# Patient Record
Sex: FEMALE | Race: BLACK OR AFRICAN AMERICAN
[De-identification: names, ages, dates, MRNs, and addresses within clinical notes are randomized per-mention and may not be internally consistent; named-entity substitution may affect disease eponyms.]

---

## 2017-01-01 ENCOUNTER — HOSPITAL ENCOUNTER (EMERGENCY)
Dept: HOSPITAL 47 - EC | Age: 37
Discharge: HOME | End: 2017-01-01
Payer: COMMERCIAL

## 2017-01-01 VITALS
TEMPERATURE: 98.4 F | HEART RATE: 87 BPM | DIASTOLIC BLOOD PRESSURE: 78 MMHG | SYSTOLIC BLOOD PRESSURE: 145 MMHG | RESPIRATION RATE: 18 BRPM

## 2017-01-01 DIAGNOSIS — Z88.1: ICD-10-CM
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
## 2017-01-01 DIAGNOSIS — F17.200: ICD-10-CM

## 2017-01-01 DIAGNOSIS — Z23: ICD-10-CM

## 2017-01-01 DIAGNOSIS — Z88.2: ICD-10-CM

## 2017-01-01 DIAGNOSIS — S61.412A: Primary | ICD-10-CM

## 2017-01-01 PROCEDURE — 12001 RPR S/N/AX/GEN/TRNK 2.5CM/<: CPT

## 2017-01-01 PROCEDURE — 90715 TDAP VACCINE 7 YRS/> IM: CPT

## 2017-01-01 PROCEDURE — 99282 EMERGENCY DEPT VISIT SF MDM: CPT

## 2017-01-01 PROCEDURE — 90471 IMMUNIZATION ADMIN: CPT

## 2017-01-01 NOTE — ED
Wound/Laceration HPI





- General


Chief Complaint: Wound/Laceration


Stated Complaint: LACERATION


Time Seen by Provider: 17 03:19


Source: patient, RN notes reviewed


Mode of arrival: ambulatory


Limitations: no limitations





- History of Present Illness


Initial Comments: 





36-year-old female presents emergency Department chief complaint laceration to 

her left hand third digit.  Patient states his happened again to light bulb.  

Patient states she's unsure last tetanus was.  Patient denies any decreased 

range of motion or numbness or tingling.  Patient offers no other complaints.





- Related Data


 Home Medications











 Medication  Instructions  Recorded  Confirmed


 


No Known Home Medications [No  17





Known Home Medications]   











 Allergies











Allergy/AdvReac Type Severity Reaction Status Date / Time


 


sulfamethoxazole Allergy  Rash/Hives Verified 17 02:59





[From Bactrim]     


 


trimethoprim [From Bactrim] Allergy  Rash/Hives Verified 17 02:59














Review of Systems


ROS Statement: 


Those systems with pertinent positive or pertinent negative responses have been 

documented in the HPI.





ROS Other: All systems not noted in ROS Statement are negative.





Past Medical History


Past Medical History: No Reported History


Additional Past Medical History / Comment(s): Anemia


History of Any Multi-Drug Resistant Organisms: None Reported


Past Surgical History:  Section, Tubal Ligation


Additional Past Surgical History / Comment(s): ganglion cyst removal-left wrist


Past Anesthesia/Blood Transfusion Reactions: No Reported Reaction


Past Psychological History: No Psychological Hx Reported


Smoking Status: Current every day smoker


Past Alcohol Use History: Occasional


Past Drug Use History: None Reported





- Past Family History


  ** Mother


Family Medical History: Hypertension





  ** Father


Family Medical History: Cancer





General Exam


Limitations: no limitations


General appearance: alert, in no apparent distress


Neck exam: Present: normal inspection.  Absent: tenderness, meningismus, 

lymphadenopathy


Respiratory exam: Present: normal lung sounds bilaterally.  Absent: respiratory 

distress, wheezes, rales, rhonchi, stridor


Cardiovascular Exam: Present: regular rate, normal rhythm, normal heart sounds.

  Absent: systolic murmur, diastolic murmur, rubs, gallop, clicks


Extremities exam: Present: other (Left hand third digit there is a 2 cm 

laceration at the base of the third digit no tendon involvement capillary 

refill 2 seconds)


Neurological exam: Present: alert, oriented X3, CN II-XII intact, reflexes 

normal.  Absent: motor sensory deficit


Skin exam: Present: warm, dry





Course





 Vital Signs











  17





  02:57


 


Temperature 98.4 F


 


Pulse Rate 87


 


Respiratory 18





Rate 


 


Blood Pressure 145/78


 


O2 Sat by Pulse 98





Oximetry 














Procedures





- Laceration


  ** Laceration #1


Consent Obtained: verbal consent


Indication: laceration


Site: hand (Left hand third digit)


Size (cm): 2


Description: irregular


Depth: simple, single layer


Anesthetic Used: lidocaine 1%, without epi


Anesthesia Technique: local infiltration


Amount (mls): 3


Pre-repair: wound explored, irrigated extensively, deep structures intact


Type of Sutures: nylon


Size of Sutures: 4-0


Number of Sutures: 4


Technique: simple, interrupted


Patient Tolerated Procedure: well, no complications





Disposition


Clinical Impression: 


 Laceration of left hand





Disposition: HOME SELF-CARE


Condition: Stable


Instructions:  Finger Laceration (ED), Care For Your Stitches (ED)


Additional Instructions: 


Have sutures removed in 10 days.  Wash the wound twice daily with soap and 

water.Please return to the Emergency Department if symptoms worsen or any other 

concerns.


Time of Disposition: 03:40

## 2017-02-03 ENCOUNTER — HOSPITAL ENCOUNTER (EMERGENCY)
Dept: HOSPITAL 47 - EC | Age: 37
Discharge: HOME | End: 2017-02-03
Payer: COMMERCIAL

## 2017-02-03 VITALS
RESPIRATION RATE: 15 BRPM | HEART RATE: 82 BPM | TEMPERATURE: 100.1 F | DIASTOLIC BLOOD PRESSURE: 55 MMHG | SYSTOLIC BLOOD PRESSURE: 106 MMHG

## 2017-02-03 DIAGNOSIS — R19.7: Primary | ICD-10-CM

## 2017-02-03 DIAGNOSIS — F17.200: ICD-10-CM

## 2017-02-03 DIAGNOSIS — Z88.2: ICD-10-CM

## 2017-02-03 DIAGNOSIS — R11.2: ICD-10-CM

## 2017-02-03 LAB
ALP SERPL-CCNC: 62 U/L (ref 38–126)
ALT SERPL-CCNC: 22 U/L (ref 9–52)
AMYLASE SERPL-CCNC: 50 U/L (ref 30–110)
ANION GAP SERPL CALC-SCNC: 11 MMOL/L
AST SERPL-CCNC: 14 U/L (ref 14–36)
BASOPHILS # BLD AUTO: 0.1 K/UL (ref 0–0.2)
BASOPHILS NFR BLD AUTO: 2 %
BUN SERPL-SCNC: 10 MG/DL (ref 7–17)
CALCIUM SPEC-MCNC: 8.5 MG/DL (ref 8.4–10.2)
CH: 29.1
CHCM: 32.8
CHLORIDE SERPL-SCNC: 107 MMOL/L (ref 98–107)
CO2 SERPL-SCNC: 21 MMOL/L (ref 22–30)
EOSINOPHIL # BLD AUTO: 0.1 K/UL (ref 0–0.7)
EOSINOPHIL NFR BLD AUTO: 2 %
ERYTHROCYTE [DISTWIDTH] IN BLOOD BY AUTOMATED COUNT: 4.33 M/UL (ref 3.8–5.4)
ERYTHROCYTE [DISTWIDTH] IN BLOOD: 12.6 % (ref 11.5–15.5)
GLUCOSE SERPL-MCNC: 77 MG/DL (ref 74–99)
HCT VFR BLD AUTO: 38.7 % (ref 34–46)
HDW: 2.49
HGB BLD-MCNC: 12.6 GM/DL (ref 11.4–16)
LUC NFR BLD AUTO: 1 %
LYMPHOCYTES # SPEC AUTO: 0.3 K/UL (ref 1–4.8)
LYMPHOCYTES NFR SPEC AUTO: 6 %
MCH RBC QN AUTO: 29.1 PG (ref 25–35)
MCHC RBC AUTO-ENTMCNC: 32.6 G/DL (ref 31–37)
MCV RBC AUTO: 89.3 FL (ref 80–100)
MONOCYTES # BLD AUTO: 0.3 K/UL (ref 0–1)
MONOCYTES NFR BLD AUTO: 5 %
NEUTROPHILS # BLD AUTO: 4.3 K/UL (ref 1.3–7.7)
NEUTROPHILS NFR BLD AUTO: 84 %
NON-AFRICAN AMERICAN GFR(MDRD): >60
PARTICLE COUNT: 7053
PH UR: 5.5 [PH] (ref 5–8)
POTASSIUM SERPL-SCNC: 3.7 MMOL/L (ref 3.5–5.1)
PROT SERPL-MCNC: 6.6 G/DL (ref 6.3–8.2)
RBC UR QL: 156 /HPF (ref 0–5)
SODIUM SERPL-SCNC: 139 MMOL/L (ref 137–145)
SP GR UR: 1.02 (ref 1–1.03)
SQUAMOUS UR QL AUTO: 2 /HPF (ref 0–4)
UA BILLING (MACRO VS. MICRO): (no result)
UROBILINOGEN UR QL STRIP: <2 MG/DL (ref ?–2)
WBC # BLD AUTO: 0.05 10*3/UL
WBC # BLD AUTO: 5.2 K/UL (ref 3.8–10.6)
WBC #/AREA URNS HPF: 1 /HPF (ref 0–5)
WBC (PEROX): 5.33

## 2017-02-03 PROCEDURE — 81001 URINALYSIS AUTO W/SCOPE: CPT

## 2017-02-03 PROCEDURE — 96372 THER/PROPH/DIAG INJ SC/IM: CPT

## 2017-02-03 PROCEDURE — 99284 EMERGENCY DEPT VISIT MOD MDM: CPT

## 2017-02-03 PROCEDURE — 85025 COMPLETE CBC W/AUTO DIFF WBC: CPT

## 2017-02-03 PROCEDURE — 96361 HYDRATE IV INFUSION ADD-ON: CPT

## 2017-02-03 PROCEDURE — 80053 COMPREHEN METABOLIC PANEL: CPT

## 2017-02-03 PROCEDURE — 36415 COLL VENOUS BLD VENIPUNCTURE: CPT

## 2017-02-03 PROCEDURE — 96374 THER/PROPH/DIAG INJ IV PUSH: CPT

## 2017-02-03 PROCEDURE — 82150 ASSAY OF AMYLASE: CPT

## 2017-02-03 PROCEDURE — 83690 ASSAY OF LIPASE: CPT

## 2017-02-03 PROCEDURE — 81025 URINE PREGNANCY TEST: CPT

## 2017-02-03 NOTE — ED
General Adult HPI





- General


Chief complaint: Nausea/Vomiting/Diarrhea


Stated complaint: flu like symptoms


Time Seen by Provider: 17 13:39


Source: patient, RN notes reviewed


Mode of arrival: ambulatory


Limitations: no limitations





- History of Present Illness


Initial comments: 





Patient 36-year-old female who presents emergency room today with a chief 

complaint of symptoms of nausea and diarrhea that started early this morning.  

Does admit that her children have similar symptoms at home.  She denies any 

abdominal pain.  States she still feeling nauseated.  States she did take 

Zofran at home.  States she's had a few episodes diarrhea.  Denies any signs of 

blood.  Patient denies any other complaints or symptoms currently. Patient 

denies any recent fever, chills, shortness of breath, chest pain, back pain, 

numbness or tingling, dysuria or hematuria, constipation, headaches or visual 

changes, or any other complaints.





- Related Data


 Previous Rx's











 Medication  Instructions  Recorded


 


Ondansetron Odt [Zofran ODT] 4 mg PO Q8HR PRN #15 tab 17











 Allergies











Allergy/AdvReac Type Severity Reaction Status Date / Time


 


sulfamethoxazole Allergy  Rash/Hives Verified 17 13:41





[From Bactrim]     


 


trimethoprim [From Bactrim] Allergy  Rash/Hives Verified 17 13:41














Review of Systems


ROS Statement: 


Those systems with pertinent positive or pertinent negative responses have been 

documented in the HPI.





ROS Other: All systems not noted in ROS Statement are negative.





Past Medical History


Past Medical History: No Reported History


Additional Past Medical History / Comment(s): Anemia


History of Any Multi-Drug Resistant Organisms: None Reported


Past Surgical History:  Section, Tubal Ligation


Additional Past Surgical History / Comment(s): ganglion cyst removal-left wrist


Past Anesthesia/Blood Transfusion Reactions: No Reported Reaction


Past Psychological History: No Psychological Hx Reported


Smoking Status: Current every day smoker


Past Alcohol Use History: Occasional


Past Drug Use History: None Reported





- Past Family History


  ** Mother


Family Medical History: Hypertension





  ** Father


Family Medical History: Cancer





General Exam





- General Exam Comments


Initial Comments: 





General:  The patient is awake and alert, in no distress, and does not appear 

acutely ill. 


Eye:  Pupils are equal, round and reactive to light, extra-ocular movements are 

intact.  No nystagmus.  There is normal conjunctiva bilaterally.  No signs of 

icterus.  


Ears, nose, mouth and throat:  There are moist mucous membranes and no oral 

lesions. 


Neck:  The neck is supple, there is no tenderness or JVD.  


Cardiovascular:  There is a regular rate and rhythm. No murmur, rub or gallop 

is appreciated.


Respiratory:  Lungs are clear to auscultation, respirations are non-labored, 

breath sounds are equal.  No wheezes, stridor, rales, or rhonchi.


Gastrointestinal:  Soft, non-distended, non-tender abdomen without masses or 

organomegaly noted. There is no rebound or guarding present.  No CVA 

tenderness. Bowel sounds are unremarkable.


Musculoskeletal:  Normal ROM, no tenderness.  Strength 5/5. Sensation intact. 

Pulses equal bilaterally 2+.  


Neurological:  A&O x 3. CN II-XII intact, There are no obvious motor or sensory 

deficits. Coordination appears grossly intact. Speech is normal.


Skin:  Skin is warm and dry and no rashes or lesions are noted. 


Psychiatric:  Cooperative, appropriate mood & affect, normal judgment.  


Limitations: no limitations





Course


 Vital Signs











  17





  13:24


 


Temperature 100.7 F H


 


Pulse Rate 107 H


 


Respiratory 16





Rate 


 


Blood Pressure 104/57


 


O2 Sat by Pulse 97





Oximetry 














Medical Decision Making





- Medical Decision Making





Patient reexamined at this time shows no signs of distress.  Patient states 

feeling better here in the emergency room.  Patient will be discharged home 

with nausea medication.  Advised to increase oral fluids.  Advised return if 

any symptoms increase or worsen.  Abdomen is soft nontender at this time.





- Lab Data


Result diagrams: 


 17 14:32





 17 14:32


 Lab Results











  17 Range/Units





  14:32 14:32 14:41 


 


WBC   5.2   (3.8-10.6)  k/uL


 


RBC   4.33   (3.80-5.40)  m/uL


 


Hgb   12.6   (11.4-16.0)  gm/dL


 


Hct   38.7   (34.0-46.0)  %


 


MCV   89.3   (80.0-100.0)  fL


 


MCH   29.1   (25.0-35.0)  pg


 


MCHC   32.6   (31.0-37.0)  g/dL


 


RDW   12.6   (11.5-15.5)  %


 


Plt Count   206   (150-450)  k/uL


 


Neutrophils %   84   %


 


Lymphocytes %   6   %


 


Monocytes %   5   %


 


Eosinophils %   2   %


 


Basophils %   2   %


 


Neutrophils #   4.3   (1.3-7.7)  k/uL


 


Lymphocytes #   0.3 L   (1.0-4.8)  k/uL


 


Monocytes #   0.3   (0-1.0)  k/uL


 


Eosinophils #   0.1   (0-0.7)  k/uL


 


Basophils #   0.1   (0-0.2)  k/uL


 


Sodium  139    (137-145)  mmol/L


 


Potassium  3.7    (3.5-5.1)  mmol/L


 


Chloride  107    ()  mmol/L


 


Carbon Dioxide  21 L    (22-30)  mmol/L


 


Anion Gap  11    mmol/L


 


BUN  10    (7-17)  mg/dL


 


Creatinine  0.60    (0.52-1.04)  mg/dL


 


Est GFR (MDRD) Af Amer  >60    (>60 ml/min/1.73 sqM)  


 


Est GFR (MDRD) Non-Af  >60    (>60 ml/min/1.73 sqM)  


 


Glucose  77    (74-99)  mg/dL


 


Calcium  8.5    (8.4-10.2)  mg/dL


 


Total Bilirubin  0.3    (0.2-1.3)  mg/dL


 


AST  14    (14-36)  U/L


 


ALT  22    (9-52)  U/L


 


Alkaline Phosphatase  62    ()  U/L


 


Total Protein  6.6    (6.3-8.2)  g/dL


 


Albumin  3.8    (3.5-5.0)  g/dL


 


Amylase  50    ()  U/L


 


Lipase  39    ()  U/L


 


Urine Color     


 


Urine Appearance     (Clear)  


 


Urine pH     (5.0-8.0)  


 


Ur Specific Gravity     (1.001-1.035)  


 


Urine Protein     (Negative)  


 


Urine Glucose (UA)     (Negative)  


 


Urine Ketones     (Negative)  


 


Urine Blood     (Negative)  


 


Urine Nitrate     (Negative)  


 


Urine Bilirubin     (Negative)  


 


Urine Urobilinogen     (<2.0)  mg/dL


 


Ur Leukocyte Esterase     (Negative)  


 


Urine RBC     (0-5)  /hpf


 


Urine WBC     (0-5)  /hpf


 


Ur Squamous Epith Cells     (0-4)  /hpf


 


Urine Bacteria     (None)  /hpf


 


Urine Mucus     (None)  /hpf


 


Urine HCG, Qual    Not Detected  (Not Detectd)  














  17 Range/Units





  14:41 


 


WBC   (3.8-10.6)  k/uL


 


RBC   (3.80-5.40)  m/uL


 


Hgb   (11.4-16.0)  gm/dL


 


Hct   (34.0-46.0)  %


 


MCV   (80.0-100.0)  fL


 


MCH   (25.0-35.0)  pg


 


MCHC   (31.0-37.0)  g/dL


 


RDW   (11.5-15.5)  %


 


Plt Count   (150-450)  k/uL


 


Neutrophils %   %


 


Lymphocytes %   %


 


Monocytes %   %


 


Eosinophils %   %


 


Basophils %   %


 


Neutrophils #   (1.3-7.7)  k/uL


 


Lymphocytes #   (1.0-4.8)  k/uL


 


Monocytes #   (0-1.0)  k/uL


 


Eosinophils #   (0-0.7)  k/uL


 


Basophils #   (0-0.2)  k/uL


 


Sodium   (137-145)  mmol/L


 


Potassium   (3.5-5.1)  mmol/L


 


Chloride   ()  mmol/L


 


Carbon Dioxide   (22-30)  mmol/L


 


Anion Gap   mmol/L


 


BUN   (7-17)  mg/dL


 


Creatinine   (0.52-1.04)  mg/dL


 


Est GFR (MDRD) Af Amer   (>60 ml/min/1.73 sqM)  


 


Est GFR (MDRD) Non-Af   (>60 ml/min/1.73 sqM)  


 


Glucose   (74-99)  mg/dL


 


Calcium   (8.4-10.2)  mg/dL


 


Total Bilirubin   (0.2-1.3)  mg/dL


 


AST   (14-36)  U/L


 


ALT   (9-52)  U/L


 


Alkaline Phosphatase   ()  U/L


 


Total Protein   (6.3-8.2)  g/dL


 


Albumin   (3.5-5.0)  g/dL


 


Amylase   ()  U/L


 


Lipase   ()  U/L


 


Urine Color  Yellow  


 


Urine Appearance  Cloudy H  (Clear)  


 


Urine pH  5.5  (5.0-8.0)  


 


Ur Specific Gravity  1.021  (1.001-1.035)  


 


Urine Protein  Trace H  (Negative)  


 


Urine Glucose (UA)  Negative  (Negative)  


 


Urine Ketones  Negative  (Negative)  


 


Urine Blood  Large H  (Negative)  


 


Urine Nitrate  Negative  (Negative)  


 


Urine Bilirubin  Negative  (Negative)  


 


Urine Urobilinogen  <2.0  (<2.0)  mg/dL


 


Ur Leukocyte Esterase  Negative  (Negative)  


 


Urine RBC  156 H  (0-5)  /hpf


 


Urine WBC  1  (0-5)  /hpf


 


Ur Squamous Epith Cells  2  (0-4)  /hpf


 


Urine Bacteria  Rare H  (None)  /hpf


 


Urine Mucus  Few H  (None)  /hpf


 


Urine HCG, Qual   (Not Detectd)  














Disposition


Clinical Impression: 


 Nausea vomiting and diarrhea





Disposition: HOME SELF-CARE


Condition: Good


Instructions:  Acute Nausea and Vomiting (ED)


Additional Instructions: 


Please use medication as discussed.  Please follow-up with family doctor in the 

next 2 days of symptoms have not improved.  Please return to emergency room if 

the symptoms increase or worsen or for any other concerns.


Prescriptions: 


Ondansetron Odt [Zofran ODT] 4 mg PO Q8HR PRN #15 tab


 PRN Reason: Nausea


Time of Disposition: 15:17

## 2017-02-11 ENCOUNTER — HOSPITAL ENCOUNTER (EMERGENCY)
Dept: HOSPITAL 47 - EC | Age: 37
Discharge: HOME | End: 2017-02-11
Payer: COMMERCIAL

## 2017-02-11 VITALS
SYSTOLIC BLOOD PRESSURE: 100 MMHG | RESPIRATION RATE: 16 BRPM | DIASTOLIC BLOOD PRESSURE: 49 MMHG | HEART RATE: 71 BPM | TEMPERATURE: 98.1 F

## 2017-02-11 DIAGNOSIS — R51: Primary | ICD-10-CM

## 2017-02-11 DIAGNOSIS — F17.200: ICD-10-CM

## 2017-02-11 DIAGNOSIS — Z88.2: ICD-10-CM

## 2017-02-11 DIAGNOSIS — Z87.891: ICD-10-CM

## 2017-02-11 DIAGNOSIS — Z88.1: ICD-10-CM

## 2017-02-11 LAB
ANION GAP SERPL CALC-SCNC: 10 MMOL/L
BASOPHILS # BLD AUTO: 0.1 K/UL (ref 0–0.2)
BASOPHILS NFR BLD AUTO: 1 %
BUN SERPL-SCNC: 11 MG/DL (ref 7–17)
CALCIUM SPEC-MCNC: 9.1 MG/DL (ref 8.4–10.2)
CH: 28.3
CHCM: 31
CHLORIDE SERPL-SCNC: 106 MMOL/L (ref 98–107)
CO2 SERPL-SCNC: 24 MMOL/L (ref 22–30)
EOSINOPHIL # BLD AUTO: 0.2 K/UL (ref 0–0.7)
EOSINOPHIL NFR BLD AUTO: 3 %
ERYTHROCYTE [DISTWIDTH] IN BLOOD BY AUTOMATED COUNT: 4.5 M/UL (ref 3.8–5.4)
ERYTHROCYTE [DISTWIDTH] IN BLOOD: 12.4 % (ref 11.5–15.5)
GLUCOSE SERPL-MCNC: 70 MG/DL (ref 74–99)
HCT VFR BLD AUTO: 41.3 % (ref 34–46)
HDW: 2.54
HGB BLD-MCNC: 12.2 GM/DL (ref 11.4–16)
LUC NFR BLD AUTO: 2 %
LYMPHOCYTES # SPEC AUTO: 1.9 K/UL (ref 1–4.8)
LYMPHOCYTES NFR SPEC AUTO: 31 %
MAGNESIUM SPEC-SCNC: 1.8 MG/DL (ref 1.6–2.3)
MCH RBC QN AUTO: 27.1 PG (ref 25–35)
MCHC RBC AUTO-ENTMCNC: 29.6 G/DL (ref 31–37)
MCV RBC AUTO: 91.7 FL (ref 80–100)
MONOCYTES # BLD AUTO: 0.4 K/UL (ref 0–1)
MONOCYTES NFR BLD AUTO: 6 %
NEUTROPHILS # BLD AUTO: 3.6 K/UL (ref 1.3–7.7)
NEUTROPHILS NFR BLD AUTO: 58 %
NON-AFRICAN AMERICAN GFR(MDRD): >60
PH UR: 6.5 [PH] (ref 5–8)
POTASSIUM SERPL-SCNC: 4 MMOL/L (ref 3.5–5.1)
SODIUM SERPL-SCNC: 140 MMOL/L (ref 137–145)
SP GR UR: 1.02 (ref 1–1.03)
UA BILLING (MACRO VS. MICRO): (no result)
UROBILINOGEN UR QL STRIP: <2 MG/DL (ref ?–2)
WBC # BLD AUTO: 0.1 10*3/UL
WBC # BLD AUTO: 6.3 K/UL (ref 3.8–10.6)
WBC (PEROX): 6.5

## 2017-02-11 PROCEDURE — 96361 HYDRATE IV INFUSION ADD-ON: CPT

## 2017-02-11 PROCEDURE — 83735 ASSAY OF MAGNESIUM: CPT

## 2017-02-11 PROCEDURE — 81003 URINALYSIS AUTO W/O SCOPE: CPT

## 2017-02-11 PROCEDURE — 80048 BASIC METABOLIC PNL TOTAL CA: CPT

## 2017-02-11 PROCEDURE — 96374 THER/PROPH/DIAG INJ IV PUSH: CPT

## 2017-02-11 PROCEDURE — 36415 COLL VENOUS BLD VENIPUNCTURE: CPT

## 2017-02-11 PROCEDURE — 99283 EMERGENCY DEPT VISIT LOW MDM: CPT

## 2017-02-11 PROCEDURE — 96375 TX/PRO/DX INJ NEW DRUG ADDON: CPT

## 2017-02-11 PROCEDURE — 85025 COMPLETE CBC W/AUTO DIFF WBC: CPT

## 2017-02-11 PROCEDURE — 81025 URINE PREGNANCY TEST: CPT

## 2017-02-11 NOTE — ED
General Adult HPI





- General


Chief complaint: Headache


Stated complaint: headache


Time Seen by Provider: 17 17:57


Source: patient


Mode of arrival: ambulatory


Limitations: no limitations





- History of Present Illness


Initial comments: 


 Patient is a 36-year-old female with history of tobacco abuse presenting with 

headache.  Headache described as bandlike.  Headache rated 7 out of 10.  

Headache started one week ago.  And not better with Tylenol and Motrin.  

Patient states she has similar headache 2 weeks ago for which resolved.  

Patient denies excessive caffeine use.  Patient denies any changes in her vision

, hearing.  Denies any weakness or numbness.  Patient has any fever, chills, 

chest pain, shortness breath, nausea, vomiting, dysuria.








- Related Data


 Home Medications











 Medication  Instructions  Recorded  Confirmed


 


No Known Home Medications [No  17





Known Home Medications]   











 Allergies











Allergy/AdvReac Type Severity Reaction Status Date / Time


 


sulfamethoxazole Allergy  Rash/Hives Verified 17 17:20





[From Bactrim]     


 


trimethoprim [From Bactrim] Allergy  Rash/Hives Verified 17 17:20














Review of Systems


ROS Statement: 


Those systems with pertinent positive or pertinent negative responses have been 

documented in the HPI.


Constitutional: No fever and no chills. 


HENT: No congestion, no rhinorrhea and no sore throat. 


Eyes: No discharge and no redness. 


Respiratory: No cough and no shortness of breath. 


Cardiovascular: No chest pain and no palpitations. 


Gastrointestinal: No nausea, no vomiting, no abdominal pain and no diarrhea. 


Genitourinary: No dysuria and no hematuria. 


Musculoskeletal: No back pain and no arthralgias. 


Skin: No pallor and no rash. 


Neurological: No dizziness and +headaches.








ROS Other: All systems not noted in ROS Statement are negative.





Past Medical History


Past Medical History: No Reported History


Additional Past Medical History / Comment(s): Anemia


History of Any Multi-Drug Resistant Organisms: None Reported


Past Surgical History:  Section, Tubal Ligation


Additional Past Surgical History / Comment(s): ganglion cyst removal-left wrist


Past Anesthesia/Blood Transfusion Reactions: No Reported Reaction


Past Psychological History: No Psychological Hx Reported


Smoking Status: Current every day smoker


Past Alcohol Use History: Occasional


Past Drug Use History: None Reported





- Past Family History


  ** Mother


Family Medical History: Hypertension





  ** Father


Family Medical History: Cancer





General Exam





- General Exam Comments


Initial Comments: 





Constitutional: Patient appears well-developed and well-nourished. No distress. 


Head: Normocephalic and atraumatic. 


Eyes: Conjunctivae and EOM are normal. Right eye exhibits no discharge. Left 

eye exhibits no discharge. No scleral icterus. 


Neck: Normal range of motion. Neck supple. 


Cardiovascular: Normal rate and regular rhythm. No murmur heard.


Pulmonary/Chest: Effort normal and breath sounds normal. No respiratory 

distress. No wheezes. 


Abdominal: Soft. No distension. There is no tenderness. There is no rebound and 

no guarding. 


Musculoskeletal: Normal range of motion. No edema or tenderness. 


Neuro Exam: A&Ox3, speech is fluent and spontaneous


CN 2: no visual field deficits, PERRL


CN 3, 4, 6: EOMI


CN 5: facial sensation intact b/l


CN 7: Eyebrow raise and smile equal b/l


CN 8: hearing intact to conversation


CN 9, 10: palate elevation equal, no hoarseness to voice


CN 11: shoulder shrug equal b/l


CN 12: tongue protrusion w/o deviation


Sensory: Intact to light touch, upper and lower extremities


Motor: No pronator drift, no atrophy, normal muscle tone, b/l muscle strength 5/

5 of hand flexors, biceps, triceps, quads, hamstrings, plantar and dorsiflexion


Skin: Skin is warm and dry. Not diaphoretic. 


Nursing notes and vitals reviewed.








Limitations: no limitations





Course


 Vital Signs











  17





  17:19 22:43


 


Temperature 98.0 F 98.1 F


 


Pulse Rate 90 71


 


Respiratory 20 16





Rate  


 


Blood Pressure 110/58 100/49


 


O2 Sat by Pulse 99 100





Oximetry  














- Reevaluation(s)


Reevaluation #1: 





Patient with unremarkable lab work.  This was updated with patient.  Patient 

received IV fluids, Reglan and Benadryl.  Patient states her headache is still 

present.  Toradol was ordered.








Medical Decision Making





- Medical Decision Making


Patient is a 36-year-old female presenting with headache.  Patient states 

typical headache that she's had similar headache 2 weeks ago.  Patient is given 

IV fluids, Reglan, Benadryl with mild improvement.  Patient was given Toradol 

with complete resolution of headache. A CT, BMP, UA, U pregnant negative. Prior 

to discharge, patient was resting comfortably in bed. Course of stay improved. 

Denies pain. Discussed physical exam and diagnostic tests with patient. 

Questions answered and patient is agreeable to discharge with close follow up 

with Primary Care Physician. Instructed to return to Emergency Department if 

symptoms worsen.  Neurology referral provided.














- Lab Data


Result diagrams: 


 17 20:44





 17 20:44


 Lab Results











  17 Range/Units





  18:17 18:17 20:44 


 


WBC     (3.8-10.6)  k/uL


 


RBC     (3.80-5.40)  m/uL


 


Hgb     (11.4-16.0)  gm/dL


 


Hct     (34.0-46.0)  %


 


MCV     (80.0-100.0)  fL


 


MCH     (25.0-35.0)  pg


 


MCHC     (31.0-37.0)  g/dL


 


RDW     (11.5-15.5)  %


 


Plt Count     (150-450)  k/uL


 


Neutrophils %     %


 


Lymphocytes %     %


 


Monocytes %     %


 


Eosinophils %     %


 


Basophils %     %


 


Neutrophils #     (1.3-7.7)  k/uL


 


Lymphocytes #     (1.0-4.8)  k/uL


 


Monocytes #     (0-1.0)  k/uL


 


Eosinophils #     (0-0.7)  k/uL


 


Basophils #     (0-0.2)  k/uL


 


Hypochromasia     


 


Sodium    140  (137-145)  mmol/L


 


Potassium    4.0  (3.5-5.1)  mmol/L


 


Chloride    106  ()  mmol/L


 


Carbon Dioxide    24  (22-30)  mmol/L


 


Anion Gap    10  mmol/L


 


BUN    11  (7-17)  mg/dL


 


Creatinine    0.56  (0.52-1.04)  mg/dL


 


Est GFR (MDRD) Af Amer    >60  (>60 ml/min/1.73 sqM)  


 


Est GFR (MDRD) Non-Af    >60  (>60 ml/min/1.73 sqM)  


 


Glucose    70 L  (74-99)  mg/dL


 


Calcium    9.1  (8.4-10.2)  mg/dL


 


Magnesium    1.8  (1.6-2.3)  mg/dL


 


Urine Color   Yellow   


 


Urine Appearance   Clear   (Clear)  


 


Urine pH   6.5   (5.0-8.0)  


 


Ur Specific Gravity   1.017   (1.001-1.035)  


 


Urine Protein   Negative   (Negative)  


 


Urine Glucose (UA)   Negative   (Negative)  


 


Urine Ketones   Negative   (Negative)  


 


Urine Blood   Negative   (Negative)  


 


Urine Nitrate   Negative   (Negative)  


 


Urine Bilirubin   Negative   (Negative)  


 


Urine Urobilinogen   <2.0   (<2.0)  mg/dL


 


Ur Leukocyte Esterase   Negative   (Negative)  


 


Urine HCG, Qual  Not Detected    (Not Detectd)  














  17 Range/Units





  20:44 


 


WBC  6.3  (3.8-10.6)  k/uL


 


RBC  4.50  (3.80-5.40)  m/uL


 


Hgb  12.2  (11.4-16.0)  gm/dL


 


Hct  41.3  (34.0-46.0)  %


 


MCV  91.7  (80.0-100.0)  fL


 


MCH  27.1  (25.0-35.0)  pg


 


MCHC  29.6 L  (31.0-37.0)  g/dL


 


RDW  12.4  (11.5-15.5)  %


 


Plt Count  265  (150-450)  k/uL


 


Neutrophils %  58  %


 


Lymphocytes %  31  %


 


Monocytes %  6  %


 


Eosinophils %  3  %


 


Basophils %  1  %


 


Neutrophils #  3.6  (1.3-7.7)  k/uL


 


Lymphocytes #  1.9  (1.0-4.8)  k/uL


 


Monocytes #  0.4  (0-1.0)  k/uL


 


Eosinophils #  0.2  (0-0.7)  k/uL


 


Basophils #  0.1  (0-0.2)  k/uL


 


Hypochromasia  Slight  


 


Sodium   (137-145)  mmol/L


 


Potassium   (3.5-5.1)  mmol/L


 


Chloride   ()  mmol/L


 


Carbon Dioxide   (22-30)  mmol/L


 


Anion Gap   mmol/L


 


BUN   (7-17)  mg/dL


 


Creatinine   (0.52-1.04)  mg/dL


 


Est GFR (MDRD) Af Amer   (>60 ml/min/1.73 sqM)  


 


Est GFR (MDRD) Non-Af   (>60 ml/min/1.73 sqM)  


 


Glucose   (74-99)  mg/dL


 


Calcium   (8.4-10.2)  mg/dL


 


Magnesium   (1.6-2.3)  mg/dL


 


Urine Color   


 


Urine Appearance   (Clear)  


 


Urine pH   (5.0-8.0)  


 


Ur Specific Gravity   (1.001-1.035)  


 


Urine Protein   (Negative)  


 


Urine Glucose (UA)   (Negative)  


 


Urine Ketones   (Negative)  


 


Urine Blood   (Negative)  


 


Urine Nitrate   (Negative)  


 


Urine Bilirubin   (Negative)  


 


Urine Urobilinogen   (<2.0)  mg/dL


 


Ur Leukocyte Esterase   (Negative)  


 


Urine HCG, Qual   (Not Detectd)  














Disposition


Clinical Impression: 


 Headache





Disposition: HOME SELF-CARE


Condition: Good


Instructions:  Acute Headache (ED)


Referrals: 


Pankaj Resendiz MD [Primary Care Provider] - 1-2 days


Mallika Crump MD [STAFF PHYSICIAN] - 1-2 days

## 2017-06-23 ENCOUNTER — HOSPITAL ENCOUNTER (EMERGENCY)
Dept: HOSPITAL 47 - EC | Age: 37
Discharge: HOME | End: 2017-06-23
Payer: COMMERCIAL

## 2017-06-23 VITALS — HEART RATE: 76 BPM | RESPIRATION RATE: 16 BRPM | TEMPERATURE: 97.3 F

## 2017-06-23 VITALS — DIASTOLIC BLOOD PRESSURE: 61 MMHG | SYSTOLIC BLOOD PRESSURE: 107 MMHG

## 2017-06-23 DIAGNOSIS — F17.200: ICD-10-CM

## 2017-06-23 DIAGNOSIS — Z88.2: ICD-10-CM

## 2017-06-23 DIAGNOSIS — R51: ICD-10-CM

## 2017-06-23 DIAGNOSIS — N39.0: Primary | ICD-10-CM

## 2017-06-23 DIAGNOSIS — R11.0: ICD-10-CM

## 2017-06-23 DIAGNOSIS — R19.7: ICD-10-CM

## 2017-06-23 DIAGNOSIS — R10.9: ICD-10-CM

## 2017-06-23 LAB
ALP SERPL-CCNC: 57 U/L (ref 38–126)
ALT SERPL-CCNC: 23 U/L (ref 9–52)
AMYLASE SERPL-CCNC: 68 U/L (ref 30–110)
ANION GAP SERPL CALC-SCNC: 13 MMOL/L
AST SERPL-CCNC: 22 U/L (ref 14–36)
BASOPHILS # BLD AUTO: 0 K/UL (ref 0–0.2)
BASOPHILS NFR BLD AUTO: 0 %
BUN SERPL-SCNC: 13 MG/DL (ref 7–17)
CALCIUM SPEC-MCNC: 9.4 MG/DL (ref 8.4–10.2)
CH: 21.6
CHCM: 28.4
CHLORIDE SERPL-SCNC: 107 MMOL/L (ref 98–107)
CO2 SERPL-SCNC: 19 MMOL/L (ref 22–30)
EOSINOPHIL # BLD AUTO: 0.1 K/UL (ref 0–0.7)
EOSINOPHIL NFR BLD AUTO: 1 %
ERYTHROCYTE [DISTWIDTH] IN BLOOD BY AUTOMATED COUNT: 3.92 M/UL (ref 3.8–5.4)
ERYTHROCYTE [DISTWIDTH] IN BLOOD: 14.7 % (ref 11.5–15.5)
GLUCOSE SERPL-MCNC: 73 MG/DL (ref 74–99)
HCT VFR BLD AUTO: 29.8 % (ref 34–46)
HDW: 3.47
HGB BLD-MCNC: 9 GM/DL (ref 11.4–16)
KETONES UR QL STRIP.AUTO: (no result)
LUC NFR BLD AUTO: 4 %
LYMPHOCYTES # SPEC AUTO: 1.6 K/UL (ref 1–4.8)
LYMPHOCYTES NFR SPEC AUTO: 29 %
MCH RBC QN AUTO: 22.9 PG (ref 25–35)
MCHC RBC AUTO-ENTMCNC: 30.1 G/DL (ref 31–37)
MCV RBC AUTO: 76 FL (ref 80–100)
MONOCYTES # BLD AUTO: 0.4 K/UL (ref 0–1)
MONOCYTES NFR BLD AUTO: 7 %
NEUTROPHILS # BLD AUTO: 3.4 K/UL (ref 1.3–7.7)
NEUTROPHILS NFR BLD AUTO: 60 %
NON-AFRICAN AMERICAN GFR(MDRD): >60
PARTICLE COUNT: 7200
PH UR: 5.5 [PH] (ref 5–8)
POTASSIUM SERPL-SCNC: 4 MMOL/L (ref 3.5–5.1)
PROT SERPL-MCNC: 7.2 G/DL (ref 6.3–8.2)
PROT UR QL: (no result)
RBC UR QL: 11 /HPF (ref 0–5)
SODIUM SERPL-SCNC: 139 MMOL/L (ref 137–145)
SP GR UR: 1.02 (ref 1–1.03)
SQUAMOUS UR QL AUTO: 3 /HPF (ref 0–4)
UA BILLING (MACRO VS. MICRO): (no result)
UROBILINOGEN UR QL STRIP: <2 MG/DL (ref ?–2)
WBC # BLD AUTO: 0.21 10*3/UL
WBC # BLD AUTO: 5.7 K/UL (ref 3.8–10.6)
WBC #/AREA URNS HPF: >182 /HPF (ref 0–5)
WBC (PEROX): 5.92

## 2017-06-23 PROCEDURE — 96372 THER/PROPH/DIAG INJ SC/IM: CPT

## 2017-06-23 PROCEDURE — 74020: CPT

## 2017-06-23 PROCEDURE — 87086 URINE CULTURE/COLONY COUNT: CPT

## 2017-06-23 PROCEDURE — 36415 COLL VENOUS BLD VENIPUNCTURE: CPT

## 2017-06-23 PROCEDURE — 82150 ASSAY OF AMYLASE: CPT

## 2017-06-23 PROCEDURE — 96361 HYDRATE IV INFUSION ADD-ON: CPT

## 2017-06-23 PROCEDURE — 99284 EMERGENCY DEPT VISIT MOD MDM: CPT

## 2017-06-23 PROCEDURE — 81025 URINE PREGNANCY TEST: CPT

## 2017-06-23 PROCEDURE — 85025 COMPLETE CBC W/AUTO DIFF WBC: CPT

## 2017-06-23 PROCEDURE — 83690 ASSAY OF LIPASE: CPT

## 2017-06-23 PROCEDURE — 81001 URINALYSIS AUTO W/SCOPE: CPT

## 2017-06-23 PROCEDURE — 80053 COMPREHEN METABOLIC PANEL: CPT

## 2017-06-23 PROCEDURE — 96365 THER/PROPH/DIAG IV INF INIT: CPT

## 2017-06-23 NOTE — ED
General Adult HPI





- General


Chief complaint: Nausea/Vomiting/Diarrhea


Stated complaint: abdominal pain/diarrhea/headaches


Time Seen by Provider: 17 16:11


Source: patient, RN notes reviewed


Mode of arrival: ambulatory


Limitations: no limitations





- History of Present Illness


Initial comments: 





36-year-old female presents emergency department with a chief complaint of 

diarrhea.  Patient states that he has crampy abdominal pain and then she will 

have diarrhea.  Patient states she is not eating or drinking much states she 

has had nausea without vomiting.  Patient states that she has felt a little 

dehydrated and a little headaches since this started as well.  Patient states 

she is here if she has a bladder infection she denies any dysuria or hematuria 

but states that seems off.  Patient states that she is not having any abdominal 

pain at this time chest cramping prior to the diarrhea. Patient denies any 

recent fever, chills, shortness of breath, chest pain, back pain, nausea 

vomiting, numbness or tingling, dysuria or hematuria, constipation, headaches 

or visual changes, or any other current symptoms.





- Related Data


 Previous Rx's











 Medication  Instructions  Recorded


 


Dicyclomine [Bentyl] 10 mg PO TID #20 capsule 17


 


Nitrofurantoin Macrocrystal 100 mg PO BID 7 Days 17





[Macrodantin]  











 Allergies











Allergy/AdvReac Type Severity Reaction Status Date / Time


 


sulfamethoxazole Allergy  Rash/Hives Verified 17 16:28





[From Bactrim]     


 


trimethoprim [From Bactrim] Allergy  Rash/Hives Verified 17 16:28














Review of Systems


ROS Statement: 


Those systems with pertinent positive or pertinent negative responses have been 

documented in the HPI.





ROS Other: All systems not noted in ROS Statement are negative.





Past Medical History


Past Medical History: No Reported History


Additional Past Medical History / Comment(s): Anemia


History of Any Multi-Drug Resistant Organisms: None Reported


Past Surgical History:  Section, Tubal Ligation


Additional Past Surgical History / Comment(s): ganglion cyst removal-left wrist


Past Anesthesia/Blood Transfusion Reactions: No Reported Reaction


Past Psychological History: No Psychological Hx Reported


Smoking Status: Current every day smoker


Past Alcohol Use History: Occasional


Past Drug Use History: None Reported





- Past Family History


  ** Mother


Family Medical History: Hypertension





  ** Father


Family Medical History: Cancer





General Exam





- General Exam Comments


Initial Comments: 





General:  The patient is awake and alert, in no distress, and does not appear 

acutely ill. 


Eye:  Pupils are equal, round and reactive to light, extra-ocular movements are 

intact; there is normal conjunctiva bilaterally.  No signs of icterus.  


Ears, nose, mouth and throat:  There are moist mucous membranes.


Neck:  The neck is supple, there is no tenderness.


Cardiovascular:  There is a regular rate and rhythm. No murmur, rub or gallop 

is appreciated.


Respiratory:  Lungs are clear to auscultation, respirations are non-labored, 

breath sounds are equal.  No wheezes, stridor, rales, or rhonchi.


Gastrointestinal:  Soft, non-distended, non-tender abdomen without masses or 

organomegaly noted. There is no rebound or guarding present.  No CVA 

tenderness. Bowel sounds are unremarkable.


Back:  There is no tenderness to palpation in the midline. There is no obvious 

deformity. No rashes noted. 


Musculoskeletal:  Normal ROM, no tenderness, There is no pedal edema. There is 

no calf tenderness or swelling. Sensation intact. Pulses equal bilaterally 2+.  


Neurological:  CN II-XII intact, There are no obvious motor or sensory 

deficits. Coordination appears grossly intact. Speech is normal.


Skin:  Skin is warm and dry and no rashes or lesions are noted. 


Psychiatric:  Cooperative, appropriate mood & affect, normal judgment.  





Limitations: no limitations





Course


 Vital Signs











  17





  15:56


 


Temperature 97.6 F


 


Pulse Rate 75


 


Respiratory 18





Rate 


 


Blood Pressure 113/74


 


O2 Sat by Pulse 100





Oximetry 














Medical Decision Making





- Medical Decision Making





36-year-old female presents emergency department chief complaint of diarrhea.  

This time patient's lab work is reviewed and patient does not RVR narrow tract 

infection.  Symptoms started patient on antibiotics for home.  We just give her 

Bentyl for abdominal cramping.  We discussed return parameters and follow-up.  

She stated that she understood and she is planned.  At this time she'll be 

discharged.  All questions have been answered.





- Lab Data


Result diagrams: 


 17 16:45





 17 16:27


 Lab Results











  17 Range/Units





  16:14 16:14 16:27 


 


WBC     (3.8-10.6)  k/uL


 


RBC     (3.80-5.40)  m/uL


 


Hgb     (11.4-16.0)  gm/dL


 


Hct     (34.0-46.0)  %


 


MCV     (80.0-100.0)  fL


 


MCH     (25.0-35.0)  pg


 


MCHC     (31.0-37.0)  g/dL


 


RDW     (11.5-15.5)  %


 


Plt Count     (150-450)  k/uL


 


Neutrophils %     %


 


Lymphocytes %     %


 


Monocytes %     %


 


Eosinophils %     %


 


Basophils %     %


 


Neutrophils #     (1.3-7.7)  k/uL


 


Lymphocytes #     (1.0-4.8)  k/uL


 


Monocytes #     (0-1.0)  k/uL


 


Eosinophils #     (0-0.7)  k/uL


 


Basophils #     (0-0.2)  k/uL


 


Hypochromasia     


 


Poikilocytosis     


 


Microcytosis     


 


Sodium    139  (137-145)  mmol/L


 


Potassium    4.0  (3.5-5.1)  mmol/L


 


Chloride    107  ()  mmol/L


 


Carbon Dioxide    19 L  (22-30)  mmol/L


 


Anion Gap    13  mmol/L


 


BUN    13  (7-17)  mg/dL


 


Creatinine    0.70  (0.52-1.04)  mg/dL


 


Est GFR (MDRD) Af Amer    >60  (>60 ml/min/1.73 sqM)  


 


Est GFR (MDRD) Non-Af    >60  (>60 ml/min/1.73 sqM)  


 


Glucose    73 L  (74-99)  mg/dL


 


Calcium    9.4  (8.4-10.2)  mg/dL


 


Total Bilirubin    0.3  (0.2-1.3)  mg/dL


 


AST    22  (14-36)  U/L


 


ALT    23  (9-52)  U/L


 


Alkaline Phosphatase    57  ()  U/L


 


Total Protein    7.2  (6.3-8.2)  g/dL


 


Albumin    4.5  (3.5-5.0)  g/dL


 


Amylase    68  ()  U/L


 


Lipase    50  ()  U/L


 


Urine Color  Yellow    


 


Urine Appearance  Cloudy H    (Clear)  


 


Urine pH  5.5    (5.0-8.0)  


 


Ur Specific Gravity  1.021    (1.001-1.035)  


 


Urine Protein  1+ H    (Negative)  


 


Urine Glucose (UA)  Negative    (Negative)  


 


Urine Ketones  1+ H    (Negative)  


 


Urine Blood  Small H    (Negative)  


 


Urine Nitrite  Negative    (Negative)  


 


Urine Bilirubin  Negative    (Negative)  


 


Urine Urobilinogen  <2.0    (<2.0)  mg/dL


 


Ur Leukocyte Esterase  Large H    (Negative)  


 


Urine RBC  11 H    (0-5)  /hpf


 


Urine WBC  >182 H    (0-5)  /hpf


 


Ur Squamous Epith Cells  3    (0-4)  /hpf


 


Hyaline Casts  7 H    (0-2)  /lpf


 


Urine Mucus  Rare H    (None)  /hpf


 


Urine HCG, Qual   Not Detected   (Not Detectd)  














  17 Range/Units





  16:45 


 


WBC  5.7  (3.8-10.6)  k/uL


 


RBC  3.92  (3.80-5.40)  m/uL


 


Hgb  9.0 L  (11.4-16.0)  gm/dL


 


Hct  29.8 L  (34.0-46.0)  %


 


MCV  76.0 L  (80.0-100.0)  fL


 


MCH  22.9 L  (25.0-35.0)  pg


 


MCHC  30.1 L  (31.0-37.0)  g/dL


 


RDW  14.7  (11.5-15.5)  %


 


Plt Count  264  (150-450)  k/uL


 


Neutrophils %  60  %


 


Lymphocytes %  29  %


 


Monocytes %  7  %


 


Eosinophils %  1  %


 


Basophils %  0  %


 


Neutrophils #  3.4  (1.3-7.7)  k/uL


 


Lymphocytes #  1.6  (1.0-4.8)  k/uL


 


Monocytes #  0.4  (0-1.0)  k/uL


 


Eosinophils #  0.1  (0-0.7)  k/uL


 


Basophils #  0.0  (0-0.2)  k/uL


 


Hypochromasia  Marked  


 


Poikilocytosis  Slight  


 


Microcytosis  Slight  


 


Sodium   (137-145)  mmol/L


 


Potassium   (3.5-5.1)  mmol/L


 


Chloride   ()  mmol/L


 


Carbon Dioxide   (22-30)  mmol/L


 


Anion Gap   mmol/L


 


BUN   (7-17)  mg/dL


 


Creatinine   (0.52-1.04)  mg/dL


 


Est GFR (MDRD) Af Amer   (>60 ml/min/1.73 sqM)  


 


Est GFR (MDRD) Non-Af   (>60 ml/min/1.73 sqM)  


 


Glucose   (74-99)  mg/dL


 


Calcium   (8.4-10.2)  mg/dL


 


Total Bilirubin   (0.2-1.3)  mg/dL


 


AST   (14-36)  U/L


 


ALT   (9-52)  U/L


 


Alkaline Phosphatase   ()  U/L


 


Total Protein   (6.3-8.2)  g/dL


 


Albumin   (3.5-5.0)  g/dL


 


Amylase   ()  U/L


 


Lipase   ()  U/L


 


Urine Color   


 


Urine Appearance   (Clear)  


 


Urine pH   (5.0-8.0)  


 


Ur Specific Gravity   (1.001-1.035)  


 


Urine Protein   (Negative)  


 


Urine Glucose (UA)   (Negative)  


 


Urine Ketones   (Negative)  


 


Urine Blood   (Negative)  


 


Urine Nitrite   (Negative)  


 


Urine Bilirubin   (Negative)  


 


Urine Urobilinogen   (<2.0)  mg/dL


 


Ur Leukocyte Esterase   (Negative)  


 


Urine RBC   (0-5)  /hpf


 


Urine WBC   (0-5)  /hpf


 


Ur Squamous Epith Cells   (0-4)  /hpf


 


Hyaline Casts   (0-2)  /lpf


 


Urine Mucus   (None)  /hpf


 


Urine HCG, Qual   (Not Detectd)  














- Radiology Data


Radiology results: report reviewed, image reviewed





Disposition


Clinical Impression: 


 UTI (urinary tract infection), Diarrhea





Disposition: HOME SELF-CARE


Condition: Stable


Instructions:  Urinary Tract Infection in Women (ED), Acute Diarrhea (ED)


Additional Instructions: 


Please use medication as discussed. Please follow up with family doctor if 

symptoms have not improved over the next two days. Please return to the 

emergency room if your symptoms increase or worsen or for any other concerns. 


Prescriptions: 


Dicyclomine [Bentyl] 10 mg PO TID #20 capsule


Nitrofurantoin Macrocrystal [Macrodantin] 100 mg PO BID 7 Days


Referrals: 


Pankaj Resendiz MD [Primary Care Provider] - 1-2 days


Time of Disposition: 17:26

## 2017-06-23 NOTE — XR
EXAMINATION TYPE: XR abdomen 2V

 

DATE OF EXAM: 6/23/2017

 

COMPARISON: 10/26/2016

 

HISTORY: Diarrhea nausea

 

TECHNIQUE: 3 views

 

FINDINGS: Bowel gas pattern is normal. There is no sign of intestinal obstruction or pneumoperitoneum
. Fecal pattern is normal. Lung bases are clear. There are no pathologic calcifications over the kidn
eys.

 

IMPRESSION: Nonacute abdomen.

## 2017-09-17 ENCOUNTER — HOSPITAL ENCOUNTER (EMERGENCY)
Dept: HOSPITAL 47 - EC | Age: 37
LOS: 1 days | Discharge: HOME | End: 2017-09-18
Payer: COMMERCIAL

## 2017-09-17 DIAGNOSIS — F17.200: ICD-10-CM

## 2017-09-17 DIAGNOSIS — Z88.2: ICD-10-CM

## 2017-09-17 DIAGNOSIS — R53.1: ICD-10-CM

## 2017-09-17 DIAGNOSIS — R07.9: Primary | ICD-10-CM

## 2017-09-17 DIAGNOSIS — R10.9: ICD-10-CM

## 2017-09-17 DIAGNOSIS — Z98.51: ICD-10-CM

## 2017-09-17 PROCEDURE — 71020: CPT

## 2017-09-17 PROCEDURE — 85379 FIBRIN DEGRADATION QUANT: CPT

## 2017-09-17 PROCEDURE — 85025 COMPLETE CBC W/AUTO DIFF WBC: CPT

## 2017-09-17 PROCEDURE — 84484 ASSAY OF TROPONIN QUANT: CPT

## 2017-09-17 PROCEDURE — 93005 ELECTROCARDIOGRAM TRACING: CPT

## 2017-09-17 PROCEDURE — 96375 TX/PRO/DX INJ NEW DRUG ADDON: CPT

## 2017-09-17 PROCEDURE — 85610 PROTHROMBIN TIME: CPT

## 2017-09-17 PROCEDURE — 81025 URINE PREGNANCY TEST: CPT

## 2017-09-17 PROCEDURE — 82550 ASSAY OF CK (CPK): CPT

## 2017-09-17 PROCEDURE — 83690 ASSAY OF LIPASE: CPT

## 2017-09-17 PROCEDURE — 85730 THROMBOPLASTIN TIME PARTIAL: CPT

## 2017-09-17 PROCEDURE — 82553 CREATINE MB FRACTION: CPT

## 2017-09-17 PROCEDURE — 96374 THER/PROPH/DIAG INJ IV PUSH: CPT

## 2017-09-17 PROCEDURE — 99285 EMERGENCY DEPT VISIT HI MDM: CPT

## 2017-09-17 PROCEDURE — 83735 ASSAY OF MAGNESIUM: CPT

## 2017-09-17 PROCEDURE — 81003 URINALYSIS AUTO W/O SCOPE: CPT

## 2017-09-17 PROCEDURE — 96361 HYDRATE IV INFUSION ADD-ON: CPT

## 2017-09-17 PROCEDURE — 36415 COLL VENOUS BLD VENIPUNCTURE: CPT

## 2017-09-17 PROCEDURE — 80053 COMPREHEN METABOLIC PANEL: CPT

## 2017-09-18 VITALS
DIASTOLIC BLOOD PRESSURE: 57 MMHG | HEART RATE: 62 BPM | RESPIRATION RATE: 16 BRPM | TEMPERATURE: 98.2 F | SYSTOLIC BLOOD PRESSURE: 114 MMHG

## 2017-09-18 LAB
ALP SERPL-CCNC: 54 U/L (ref 38–126)
ALT SERPL-CCNC: 21 U/L (ref 9–52)
ANION GAP SERPL CALC-SCNC: 10 MMOL/L
APTT BLD: 24.7 SEC (ref 22–30)
AST SERPL-CCNC: 27 U/L (ref 14–36)
BASOPHILS # BLD AUTO: 0 K/UL (ref 0–0.2)
BASOPHILS NFR BLD AUTO: 1 %
BUN SERPL-SCNC: 11 MG/DL (ref 7–17)
CALCIUM SPEC-MCNC: 9.1 MG/DL (ref 8.4–10.2)
CH: 18
CHCM: 25.2
CHLORIDE SERPL-SCNC: 108 MMOL/L (ref 98–107)
CK SERPL-CCNC: 75 U/L (ref 30–135)
CO2 SERPL-SCNC: 20 MMOL/L (ref 22–30)
EOSINOPHIL # BLD AUTO: 0.1 K/UL (ref 0–0.7)
EOSINOPHIL NFR BLD AUTO: 2 %
ERYTHROCYTE [DISTWIDTH] IN BLOOD BY AUTOMATED COUNT: 3.81 M/UL (ref 3.8–5.4)
ERYTHROCYTE [DISTWIDTH] IN BLOOD: 18 % (ref 11.5–15.5)
GLUCOSE SERPL-MCNC: 73 MG/DL (ref 74–99)
HCT VFR BLD AUTO: 27.3 % (ref 34–46)
HDW: 3.21
HGB BLD-MCNC: 7.2 GM/DL (ref 11.4–16)
INR PPP: 1.3 (ref ?–1.2)
LUC NFR BLD AUTO: 2 %
LYMPHOCYTES # SPEC AUTO: 1.7 K/UL (ref 1–4.8)
LYMPHOCYTES NFR SPEC AUTO: 24 %
MAGNESIUM SPEC-SCNC: 1.6 MG/DL (ref 1.6–2.3)
MCH RBC QN AUTO: 18.9 PG (ref 25–35)
MCHC RBC AUTO-ENTMCNC: 26.4 G/DL (ref 31–37)
MCV RBC AUTO: 71.6 FL (ref 80–100)
MONOCYTES # BLD AUTO: 0.3 K/UL (ref 0–1)
MONOCYTES NFR BLD AUTO: 4 %
NEUTROPHILS # BLD AUTO: 4.7 K/UL (ref 1.3–7.7)
NEUTROPHILS NFR BLD AUTO: 68 %
NON-AFRICAN AMERICAN GFR(MDRD): >60
PH UR: 7 [PH] (ref 5–8)
POTASSIUM SERPL-SCNC: 3.9 MMOL/L (ref 3.5–5.1)
PROT SERPL-MCNC: 6.6 G/DL (ref 6.3–8.2)
PT BLD: 12.6 SEC (ref 9–12)
SODIUM SERPL-SCNC: 138 MMOL/L (ref 137–145)
SP GR UR: 1.01 (ref 1–1.03)
TROPONIN I SERPL-MCNC: <0.012 NG/ML (ref 0–0.03)
UA BILLING (MACRO VS. MICRO): (no result)
UROBILINOGEN UR QL STRIP: <2 MG/DL (ref ?–2)
WBC # BLD AUTO: 0.14 10*3/UL
WBC # BLD AUTO: 6.9 K/UL (ref 3.8–10.6)
WBC (PEROX): 6.49

## 2017-09-18 NOTE — XR
EXAM:

  XR Chest, 2 Views

 

CLINICAL HISTORY:

  Reason: Chest Pain

 

TECHNIQUE:

  Frontal and lateral views of the chest.

 

COMPARISON:

  Chest x-ray 5/26/2016

 

FINDINGS:

  Lungs:  Unremarkable.  No consolidation.

  Pleural space:  No pleural effusion. No pneumothorax.

  Heart:  Unremarkable.  No cardiomegaly.

  Mediastinum:  Unremarkable.

  Bones/joints:  Unremarkable.

 

IMPRESSION:     

  No acute cardiopulmonary disease.

## 2017-09-20 ENCOUNTER — HOSPITAL ENCOUNTER (OUTPATIENT)
Dept: HOSPITAL 47 - EC | Age: 37
Setting detail: OBSERVATION
LOS: 1 days | Discharge: HOME | End: 2017-09-21
Attending: INTERNAL MEDICINE | Admitting: INTERNAL MEDICINE
Payer: COMMERCIAL

## 2017-09-20 VITALS — BODY MASS INDEX: 25 KG/M2

## 2017-09-20 DIAGNOSIS — Z88.2: ICD-10-CM

## 2017-09-20 DIAGNOSIS — N92.1: ICD-10-CM

## 2017-09-20 DIAGNOSIS — Z98.51: ICD-10-CM

## 2017-09-20 DIAGNOSIS — D50.9: ICD-10-CM

## 2017-09-20 DIAGNOSIS — R07.9: ICD-10-CM

## 2017-09-20 DIAGNOSIS — F17.200: ICD-10-CM

## 2017-09-20 DIAGNOSIS — D62: Primary | ICD-10-CM

## 2017-09-20 DIAGNOSIS — Z88.1: ICD-10-CM

## 2017-09-20 DIAGNOSIS — E66.9: ICD-10-CM

## 2017-09-20 LAB
ALP SERPL-CCNC: 53 U/L (ref 38–126)
ALT SERPL-CCNC: 20 U/L (ref 9–52)
ANION GAP SERPL CALC-SCNC: 11 MMOL/L
APTT BLD: 24.4 SEC (ref 22–30)
AST SERPL-CCNC: 26 U/L (ref 14–36)
BASOPHILS # BLD AUTO: 0 K/UL (ref 0–0.2)
BASOPHILS NFR BLD AUTO: 1 %
BUN SERPL-SCNC: 12 MG/DL (ref 7–17)
CALCIUM SPEC-MCNC: 9.4 MG/DL (ref 8.4–10.2)
CH: 17.7
CHCM: 26
CHLORIDE SERPL-SCNC: 107 MMOL/L (ref 98–107)
CK SERPL-CCNC: 70 U/L (ref 30–135)
CO2 SERPL-SCNC: 21 MMOL/L (ref 22–30)
EOSINOPHIL # BLD AUTO: 0.1 K/UL (ref 0–0.7)
EOSINOPHIL NFR BLD AUTO: 2 %
ERYTHROCYTE [DISTWIDTH] IN BLOOD BY AUTOMATED COUNT: 4.12 M/UL (ref 3.8–5.4)
ERYTHROCYTE [DISTWIDTH] IN BLOOD: 17.6 % (ref 11.5–15.5)
GLUCOSE SERPL-MCNC: 73 MG/DL (ref 74–99)
HCT VFR BLD AUTO: 28.2 % (ref 34–46)
HDW: 3.4
HGB BLD-MCNC: 7.6 GM/DL (ref 11.4–16)
INR PPP: 1.3 (ref ?–1.2)
LUC NFR BLD AUTO: 3 %
LYMPHOCYTES # SPEC AUTO: 1.8 K/UL (ref 1–4.8)
LYMPHOCYTES NFR SPEC AUTO: 35 %
MAGNESIUM SPEC-SCNC: 1.6 MG/DL (ref 1.6–2.3)
MCH RBC QN AUTO: 18.5 PG (ref 25–35)
MCHC RBC AUTO-ENTMCNC: 27 G/DL (ref 31–37)
MCV RBC AUTO: 68.5 FL (ref 80–100)
MONOCYTES # BLD AUTO: 0.4 K/UL (ref 0–1)
MONOCYTES NFR BLD AUTO: 7 %
NEUTROPHILS # BLD AUTO: 2.6 K/UL (ref 1.3–7.7)
NEUTROPHILS NFR BLD AUTO: 52 %
NON-AFRICAN AMERICAN GFR(MDRD): >60
POTASSIUM SERPL-SCNC: 4 MMOL/L (ref 3.5–5.1)
PROT SERPL-MCNC: 7.1 G/DL (ref 6.3–8.2)
PT BLD: 12.4 SEC (ref 9–12)
SODIUM SERPL-SCNC: 139 MMOL/L (ref 137–145)
WBC # BLD AUTO: 0.15 10*3/UL
WBC # BLD AUTO: 5.1 K/UL (ref 3.8–10.6)
WBC (PEROX): 5.37

## 2017-09-20 PROCEDURE — 85610 PROTHROMBIN TIME: CPT

## 2017-09-20 PROCEDURE — 86901 BLOOD TYPING SEROLOGIC RH(D): CPT

## 2017-09-20 PROCEDURE — 82553 CREATINE MB FRACTION: CPT

## 2017-09-20 PROCEDURE — 99285 EMERGENCY DEPT VISIT HI MDM: CPT

## 2017-09-20 PROCEDURE — 82728 ASSAY OF FERRITIN: CPT

## 2017-09-20 PROCEDURE — 85730 THROMBOPLASTIN TIME PARTIAL: CPT

## 2017-09-20 PROCEDURE — 85025 COMPLETE CBC W/AUTO DIFF WBC: CPT

## 2017-09-20 PROCEDURE — 86900 BLOOD TYPING SEROLOGIC ABO: CPT

## 2017-09-20 PROCEDURE — 36415 COLL VENOUS BLD VENIPUNCTURE: CPT

## 2017-09-20 PROCEDURE — 71020: CPT

## 2017-09-20 PROCEDURE — 83735 ASSAY OF MAGNESIUM: CPT

## 2017-09-20 PROCEDURE — 82550 ASSAY OF CK (CPK): CPT

## 2017-09-20 PROCEDURE — 93005 ELECTROCARDIOGRAM TRACING: CPT

## 2017-09-20 PROCEDURE — 80053 COMPREHEN METABOLIC PANEL: CPT

## 2017-09-20 PROCEDURE — 86850 RBC ANTIBODY SCREEN: CPT

## 2017-09-20 PROCEDURE — 86920 COMPATIBILITY TEST SPIN: CPT

## 2017-09-20 NOTE — ED
Dizziness HPI





- General


Chief Complaint: Dizziness


Stated Complaint: Abnormal Labs


Time Seen by Provider: 17 22:10


Source: patient, RN notes reviewed


Mode of arrival: ambulatory


Limitations: no limitations





- History of Present Illness


Initial Comments: 





This is a 37-year-old female history of anemia secondary to very heavy 

menstrual periods who presents with complaints of persistent dizziness 

lightheadedness weakness also has had some chest pain and a pounding headache.  

She states she was here 3 days ago for similar symptoms and told she was okay 

went home her doctor sent her in for evaluation for finding a low hemoglobin.  

Patient has had transfusions in the past.  She has a hematemesis any blood per 

rectum.


MD Complaint: dizziness, lightheadedness, other





- Related Data


 Home Medications











 Medication  Instructions  Recorded  Confirmed


 


No Known Home Medications [No  17





Known Home Medications]   











 Allergies











Allergy/AdvReac Type Severity Reaction Status Date / Time


 


sulfamethoxazole Allergy  Rash/Hives Verified 17 22:24





[From Bactrim]     


 


trimethoprim [From Bactrim] Allergy  Rash/Hives Verified 17 22:24














Review of Systems


ROS Statement: 


Those systems with pertinent positive or pertinent negative responses have been 

documented in the HPI.





ROS Other: All systems not noted in ROS Statement are negative.





Past Medical History


Past Medical History: No Reported History


Additional Past Medical History / Comment(s): Anemia


History of Any Multi-Drug Resistant Organisms: None Reported


Past Surgical History:  Section, Tubal Ligation


Additional Past Surgical History / Comment(s): ganglion cyst removal-left wrist


Past Anesthesia/Blood Transfusion Reactions: No Reported Reaction


Past Psychological History: No Psychological Hx Reported


Smoking Status: Current every day smoker


Past Alcohol Use History: Occasional


Past Drug Use History: None Reported





- Past Family History


  ** Mother


Family Medical History: Hypertension





  ** Father


Family Medical History: Cancer





General Exam





- General Exam Comments


Initial Comments: 





This is a well-developed well-nourished awake alert oriented x 3 female


Limitations: no limitations


General appearance: alert, in no apparent distress


Head exam: Present: atraumatic, normocephalic, normal inspection


Eye exam: Present: normal appearance, PERRL, EOMI.  Absent: scleral icterus, 

conjunctival injection, periorbital swelling


ENT exam: Present: normal exam, mucous membranes moist


Neck exam: Present: normal inspection.  Absent: tenderness, meningismus, 

lymphadenopathy


Respiratory exam: Present: normal lung sounds bilaterally.  Absent: respiratory 

distress, wheezes, rales, rhonchi, stridor


Cardiovascular Exam: Present: regular rate, normal rhythm, normal heart sounds.

  Absent: systolic murmur, diastolic murmur, rubs, gallop, clicks


GI/Abdominal exam: Present: soft, normal bowel sounds.  Absent: distended, 

tenderness, guarding, rebound, rigid


Extremities exam: Present: normal inspection, full ROM, normal capillary 

refill.  Absent: tenderness, pedal edema, joint swelling, calf tenderness


Back exam: Present: normal inspection


Neurological exam: Present: alert, oriented X3, CN II-XII intact


Psychiatric exam: Present: normal affect, normal mood


Skin exam: Present: warm, dry, intact, normal color.  Absent: rash





Course


 Vital Signs











  17





  21:50 23:03


 


Temperature 98.1 F 


 


Pulse Rate 79 78


 


Respiratory 18 18





Rate  


 


Blood Pressure 116/65 102/82


 


O2 Sat by Pulse 100 100





Oximetry  














EKG Findings





- EKG Results:


EKG: interpreted by CHRIS MORAN (Sinus rhythm rate 65.  Interval 170 QRS duration 

80 daily since QTC of 14/434 this is a well-appearing EKG.), sinus rhythm, 

normal axis, normal QRS, normal ST/T, no acute changes





Medical Decision Making





- Medical Decision Making





I did discuss the findings with the patient and her family members.  Patient 

still somewhat symptomatic with a headache and dizziness she will receive a 

transfusion of 1 unit packed red blood cells.  I did discuss the case with her 

family and with Dr. Varghese.





- Lab Data


Result diagrams: 


 17 22:10





 17 22:10


 Lab Results











  17 Range/Units





  22:10 22:10 22:10 


 


WBC     (3.8-10.6)  k/uL


 


RBC     (3.80-5.40)  m/uL


 


Hgb     (11.4-16.0)  gm/dL


 


Hct     (34.0-46.0)  %


 


MCV     (80.0-100.0)  fL


 


MCH     (25.0-35.0)  pg


 


MCHC     (31.0-37.0)  g/dL


 


RDW     (11.5-15.5)  %


 


Plt Count     (150-450)  k/uL


 


Neutrophils %     %


 


Lymphocytes %     %


 


Monocytes %     %


 


Eosinophils %     %


 


Basophils %     %


 


Neutrophils #     (1.3-7.7)  k/uL


 


Lymphocytes #     (1.0-4.8)  k/uL


 


Monocytes #     (0-1.0)  k/uL


 


Eosinophils #     (0-0.7)  k/uL


 


Basophils #     (0-0.2)  k/uL


 


Hypochromasia     


 


Anisocytosis     


 


Microcytosis     


 


PT   12.4 H   (9.0-12.0)  sec


 


INR   1.3 H   (<1.2)  


 


APTT   24.4   (22.0-30.0)  sec


 


Sodium     (137-145)  mmol/L


 


Potassium     (3.5-5.1)  mmol/L


 


Chloride     ()  mmol/L


 


Carbon Dioxide     (22-30)  mmol/L


 


Anion Gap     mmol/L


 


BUN     (7-17)  mg/dL


 


Creatinine     (0.52-1.04)  mg/dL


 


Est GFR (MDRD) Af Amer     (>60 ml/min/1.73 sqM)  


 


Est GFR (MDRD) Non-Af     (>60 ml/min/1.73 sqM)  


 


Glucose     (74-99)  mg/dL


 


Calcium     (8.4-10.2)  mg/dL


 


Magnesium     (1.6-2.3)  mg/dL


 


Total Bilirubin     (0.2-1.3)  mg/dL


 


AST     (14-36)  U/L


 


ALT     (9-52)  U/L


 


Alkaline Phosphatase     ()  U/L


 


Total Creatine Kinase    70  ()  U/L


 


CK-MB (CK-2)    <0.2  (0.0-2.4)  ng/mL


 


CK-MB (CK-2) Rel Index      


 


Total Protein     (6.3-8.2)  g/dL


 


Albumin     (3.5-5.0)  g/dL


 


Blood Type  AB Positive    


 


Blood Type Recheck  No    


 


Antibody Screen  NEGATIVE    


 


Spec Expiration Date  2017 - 17 Range/Units





  22:10 22:10 


 


WBC  5.1   (3.8-10.6)  k/uL


 


RBC  4.12   (3.80-5.40)  m/uL


 


Hgb  7.6 L   (11.4-16.0)  gm/dL


 


Hct  28.2 L   (34.0-46.0)  %


 


MCV  68.5 L   (80.0-100.0)  fL


 


MCH  18.5 L   (25.0-35.0)  pg


 


MCHC  27.0 L   (31.0-37.0)  g/dL


 


RDW  17.6 H   (11.5-15.5)  %


 


Plt Count  510 H   (150-450)  k/uL


 


Neutrophils %  52   %


 


Lymphocytes %  35   %


 


Monocytes %  7   %


 


Eosinophils %  2   %


 


Basophils %  1   %


 


Neutrophils #  2.6   (1.3-7.7)  k/uL


 


Lymphocytes #  1.8   (1.0-4.8)  k/uL


 


Monocytes #  0.4   (0-1.0)  k/uL


 


Eosinophils #  0.1   (0-0.7)  k/uL


 


Basophils #  0.0   (0-0.2)  k/uL


 


Hypochromasia  Marked   


 


Anisocytosis  Slight   


 


Microcytosis  Marked   


 


PT    (9.0-12.0)  sec


 


INR    (<1.2)  


 


APTT    (22.0-30.0)  sec


 


Sodium   139  (137-145)  mmol/L


 


Potassium   4.0  (3.5-5.1)  mmol/L


 


Chloride   107  ()  mmol/L


 


Carbon Dioxide   21 L  (22-30)  mmol/L


 


Anion Gap   11  mmol/L


 


BUN   12  (7-17)  mg/dL


 


Creatinine   0.65  (0.52-1.04)  mg/dL


 


Est GFR (MDRD) Af Amer   >60  (>60 ml/min/1.73 sqM)  


 


Est GFR (MDRD) Non-Af   >60  (>60 ml/min/1.73 sqM)  


 


Glucose   73 L  (74-99)  mg/dL


 


Calcium   9.4  (8.4-10.2)  mg/dL


 


Magnesium   1.6  (1.6-2.3)  mg/dL


 


Total Bilirubin   0.2  (0.2-1.3)  mg/dL


 


AST   26  (14-36)  U/L


 


ALT   20  (9-52)  U/L


 


Alkaline Phosphatase   53  ()  U/L


 


Total Creatine Kinase    ()  U/L


 


CK-MB (CK-2)    (0.0-2.4)  ng/mL


 


CK-MB (CK-2) Rel Index    


 


Total Protein   7.1  (6.3-8.2)  g/dL


 


Albumin   4.3  (3.5-5.0)  g/dL


 


Blood Type    


 


Blood Type Recheck    


 


Antibody Screen    


 


Spec Expiration Date    














Disposition


Clinical Impression: 


 Symptomatic anemia





Disposition: ADMITTED AS IP TO THIS HOSP


Condition: Stable


Referrals: 


Pankaj Resendiz MD [Primary Care Provider] - 1-2 days

## 2017-09-20 NOTE — XR
EXAM:

  XR Chest, 2 Views

 

CLINICAL HISTORY:

  Reason: cough

 

TECHNIQUE:

  Frontal and lateral views of the chest.

 

COMPARISON:

  9/18/2017

 

FINDINGS:

  Lungs:  Unremarkable.  No consolidation.

  Pleural space:  Unremarkable.  No pneumothorax.

  Heart:  Unremarkable.  No cardiomegaly.

  Mediastinum:  Unremarkable.

  Bones/joints:  Unremarkable.

 

IMPRESSION:     

  No evidence of acute cardiopulmonary disease.

## 2017-09-21 VITALS — TEMPERATURE: 98 F

## 2017-09-21 VITALS — RESPIRATION RATE: 16 BRPM

## 2017-09-21 NOTE — P.DS
Providers


Date of admission: 


09/20/17 23:50





Attending physician: 


Kanika Varghese





Primary care physician: 


Martín Lira





[facility] Course: 


Please refer to HPI





Patient Condition at Discharge: Stable





Plan - Discharge Summary


New Discharge Prescriptions: 


New


   Ferrous Sulfate [Feosol] 325 mg PO BID #60 tab


   Polyethylene Glycol 3350 [Miralax] 17 gm PO DAILY PRN #15 packet


     PRN Reason: Constipation


Discharge Medication List





Ferrous Sulfate [Feosol] 325 mg PO BID #60 tab 09/21/17 [Rx]


Polyethylene Glycol 3350 [Miralax] 17 gm PO DAILY PRN #15 packet 09/21/17 [Rx]








Follow up Appointment(s)/Referral(s): 


Pankaj Resendiz MD [Primary Care Provider] - 3 Days


Discharge Disposition: HOME SELF-CARE

## 2017-09-21 NOTE — P.HPIM
History of Present Illness


37-year-old female came in with the complaints of palpitations lightheadedness 

found to be severely anemic received 1 unit of blood transfusion patient's the 

anemia with hemoglobin of 7.1 is secondary to excessive menometrorrhagia, 

patient was evaluated in the past and was recommended that she needs either DIC 

original operation procedure in the past and patient will need to follow with OB

/GYN patient is a monitor blood transfusion with improvement of symptoms.  I 

ordered ferritin level.  If ferritin is less than 20 will give her IV iron 

supplementation and subsequently patient will be discharged.  If patient's 

ferritin level is rather than 20 patient will be prescribed the twice a day 

ferrous sulfate and something for constipation and will be discharged.  Patient 

will follow with Dr. White next week.








Review of Systems


REVIEW OF SYSTEMS: 


CONSTITUTIONAL: As mentioned in HPI


HEENT: No recent visual problems or hearing problems. Denied any sore throat. 


CARDIOVASCULAR: No chest pain, orthopnea, PND,  no syncope. 


PULMONARY: No shortness of breath, no cough, no hemoptysis. 


GASTROINTESTINAL: No diarrhea, no nausea, no vomiting, no abdominal pain. 

Normoactive bowel sounds. 


NEUROLOGICAL: No headaches, no weakness, no numbness. 


HEMATOLOGICAL: Denies any bleeding or petechiae. 


GENITOURINARY: Denies any burning micturition, frequency, or urgency. 


MUSCULOSKELETAL/RHEUMATOLOGICAL: Denies any joint pain, swelling, or any muscle 

pain. 


ENDOCRINE: Denies any polyuria or polydipsia. 





The rest of the 14-point review of systems is negative.











Past Medical History


Past Medical History: No Reported History


Additional Past Medical History / Comment(s): Anemia


History of Any Multi-Drug Resistant Organisms: None Reported


Past Surgical History:  Section, Tubal Ligation


Additional Past Surgical History / Comment(s): ganglion cyst removal-left wrist


Past Anesthesia/Blood Transfusion Reactions: No Reported Reaction


Past Psychological History: No Psychological Hx Reported


Smoking Status: Current every day smoker


Past Alcohol Use History: Occasional


Past Drug Use History: None Reported





- Past Family History


  ** Mother


Family Medical History: Hypertension





  ** Father


Family Medical History: Cancer





Medications and Allergies


 Home Medications











 Medication  Instructions  Recorded  Confirmed  Type


 


Ferrous Sulfate [Feosol] 325 mg PO BID #60 tab 17  Rx


 


Polyethylene Glycol 3350 [Miralax] 17 gm PO DAILY PRN #15 packet 17  Rx











 Allergies











Allergy/AdvReac Type Severity Reaction Status Date / Time


 


sulfamethoxazole Allergy  Rash/Hives Verified 17 22:24





[From Bactrim]     


 


trimethoprim [From Bactrim] Allergy  Rash/Hives Verified 17 22:24














Physical Exam


Vitals: 


 Vital Signs











  Temp Pulse Pulse Resp BP BP Pulse Ox


 


 17 08:59  97.9 F   66  16   92/53  99


 


 17 02:25  97.5 F L  70   16  103/57   99


 


 17 02:00     16   


 


 17 01:55  97.8 F  76   16  107/63   100


 


 17 01:45  97.6 F  74   16  103/61   100


 


 17 00:25  97.9 F   70  16   117/78  100


 


 17 00:09  98.6 F  77   18  110/70   100


 


 17 23:03   78   18  102/82   100


 


 17 21:50  98.1 F  79   18  116/65   100








 Intake and Output











 17





 22:59 06:59 14:59


 


Intake Total  760 


 


Balance  760 


 


Intake:   


 


  Intake, IV Titration  50 





  Amount   


 


    Sodium Chloride 0.9% 1,  50 





    000 ml @ 20 mls/hr IV .   





    Q24H [facility] Rx#:[id number]   


 


  Oral  400 


 


  Blood Product  310 


 


    Rc As-3  Unit  0 





    [id number]   


 


Other:   


 


  Voiding Method  Toilet 


 


  Weight 68.039 kg 68.039 kg 














PHYSICAL EXAMINATION: 





GENERAL: The patient is alert and oriented x3, not in any acute distress. Well 

developed, well nourished. 


HEENT: Pupils are round and equally reacting to light. EOMI. No scleral 

icterus.  Patient does have conjunctival pallor. Normocephalic, atraumatic. No 

pharyngeal erythema. No thyromegaly. 


CARDIOVASCULAR: S1 and S2 present. No murmurs, rubs, or gallops. 


PULMONARY: Chest is clear to auscultation, no wheezing or crackles. 


ABDOMEN: Soft, nontender, nondistended, normoactive bowel sounds. No palpable 

organomegaly. 


MUSCULOSKELETAL: No joint swelling or deformity.


EXTREMITIES: No cyanosis, clubbing, or pedal edema. 


NEUROLOGICAL: Gross neurological examination did not reveal any focal deficits. 


SKIN: No rashes. 








Results


CBC & Chem 7: 


 17 22:10





 17 22:10


Labs: 


 Abnormal Lab Results - Last 24 Hours (Table)











  17 Range/Units





  22:10 22:10 22:10 


 


Hgb    7.6 L  (11.4-16.0)  gm/dL


 


Hct    28.2 L  (34.0-46.0)  %


 


MCV    68.5 L  (80.0-100.0)  fL


 


MCH    18.5 L  (25.0-35.0)  pg


 


MCHC    27.0 L  (31.0-37.0)  g/dL


 


RDW    17.6 H  (11.5-15.5)  %


 


Plt Count    510 H  (150-450)  k/uL


 


PT   12.4 H   (9.0-12.0)  sec


 


INR   1.3 H   (<1.2)  


 


Carbon Dioxide     (22-30)  mmol/L


 


Glucose     (74-99)  mg/dL


 


Crossmatch  See Detail    














  17 Range/Units





  22:10 


 


Hgb   (11.4-16.0)  gm/dL


 


Hct   (34.0-46.0)  %


 


MCV   (80.0-100.0)  fL


 


MCH   (25.0-35.0)  pg


 


MCHC   (31.0-37.0)  g/dL


 


RDW   (11.5-15.5)  %


 


Plt Count   (150-450)  k/uL


 


PT   (9.0-12.0)  sec


 


INR   (<1.2)  


 


Carbon Dioxide  21 L  (22-30)  mmol/L


 


Glucose  73 L  (74-99)  mg/dL


 


Crossmatch   














Thrombosis Risk Factor Assmnt





- Choose All That Apply


Each Factor Represents 1 point: Obesity (BMI >25)


Thrombosis Risk Factor Assessment Total Risk Factor Score: 1


Thrombosis Risk Factor Assessment Level: Low Risk





Assessment and Plan


Plan: 


#1 symptomatically anemia: Secondary to acute genitourinary bleed, that is 

vaginal bleed secondary to menometrorrhagia.  Patient is a monitor blood 

transfusion.


#2 Iron deficiency anemia


#3 menometrorrhagia for which patient will need to follow up with the GYN 

services as an outpatient





Ferritin level will be obtained and further management with either IV iron 

supplementation and/or oral iron supplementation as mentioned in the history.

## 2017-09-25 ENCOUNTER — HOSPITAL ENCOUNTER (OUTPATIENT)
Dept: HOSPITAL 47 - LABMAIN | Age: 37
Discharge: HOME | End: 2017-09-25
Payer: COMMERCIAL

## 2017-09-25 VITALS — SYSTOLIC BLOOD PRESSURE: 101 MMHG | DIASTOLIC BLOOD PRESSURE: 62 MMHG | HEART RATE: 70 BPM

## 2017-09-25 DIAGNOSIS — D64.9: Primary | ICD-10-CM

## 2017-09-25 DIAGNOSIS — R53.83: ICD-10-CM

## 2017-09-25 LAB
BASOPHILS # BLD AUTO: 0.1 K/UL (ref 0–0.2)
BASOPHILS NFR BLD AUTO: 1 %
CH: 19.5
CHCM: 25.8
EOSINOPHIL # BLD AUTO: 0.2 K/UL (ref 0–0.7)
EOSINOPHIL NFR BLD AUTO: 3 %
ERYTHROCYTE [DISTWIDTH] IN BLOOD BY AUTOMATED COUNT: 4.76 M/UL (ref 3.8–5.4)
ERYTHROCYTE [DISTWIDTH] IN BLOOD: 19.3 % (ref 11.5–15.5)
HCT VFR BLD AUTO: 36 % (ref 34–46)
HDW: 4.17
HGB BLD-MCNC: 9.8 GM/DL (ref 11.4–16)
LUC NFR BLD AUTO: 4 %
LYMPHOCYTES # SPEC AUTO: 2.8 K/UL (ref 1–4.8)
LYMPHOCYTES NFR SPEC AUTO: 41 %
MCH RBC QN AUTO: 20.6 PG (ref 25–35)
MCHC RBC AUTO-ENTMCNC: 27.2 G/DL (ref 31–37)
MCV RBC AUTO: 75.7 FL (ref 80–100)
MONOCYTES # BLD AUTO: 0.4 K/UL (ref 0–1)
MONOCYTES NFR BLD AUTO: 6 %
NEUTROPHILS # BLD AUTO: 3.1 K/UL (ref 1.3–7.7)
NEUTROPHILS NFR BLD AUTO: 46 %
WBC # BLD AUTO: 0.28 10*3/UL
WBC # BLD AUTO: 6.7 K/UL (ref 3.8–10.6)
WBC (PEROX): 6.96

## 2017-09-25 PROCEDURE — 36415 COLL VENOUS BLD VENIPUNCTURE: CPT

## 2017-09-25 PROCEDURE — 83540 ASSAY OF IRON: CPT

## 2017-09-25 PROCEDURE — 85025 COMPLETE CBC W/AUTO DIFF WBC: CPT

## 2017-09-25 PROCEDURE — 83550 IRON BINDING TEST: CPT

## 2017-09-26 LAB
IRON SERPL-MCNC: 31 UG/DL (ref 50–170)
TIBC SERPL-MCNC: 457 UG/DL (ref 228–460)

## 2017-11-09 ENCOUNTER — HOSPITAL ENCOUNTER (OUTPATIENT)
Dept: HOSPITAL 47 - RADUSWWP | Age: 37
End: 2017-11-09
Payer: COMMERCIAL

## 2017-11-09 DIAGNOSIS — D25.9: Primary | ICD-10-CM

## 2017-11-09 PROCEDURE — 76830 TRANSVAGINAL US NON-OB: CPT

## 2017-11-09 PROCEDURE — 76856 US EXAM PELVIC COMPLETE: CPT

## 2017-11-09 NOTE — US
EXAMINATION TYPE: US pelvis complete transvag

 

DATE OF EXAM: 2017

 

COMPARISON: Pelvic ultrasound 2016

 

CLINICAL HISTORY: N92.0 MENORRHAGIA IRREGULAR CYCLE. Uterine fibroids; C Section x 2; 

 

TECHNIQUE:  Transvaginal (TV) to better assess endometrium and Transabdominal (TA) 

 

Date of LMP:  

 

EXAM MEASUREMENTS:

 

Uterus:  9.2 x 5.0 x 5.5 cm

Endometrial Stripe: 0.6 cm

Right Ovary:  3.8 x 1.8 x 2.0 cm

Left Ovary:  2.4 x 1.8 x 1.8 cm

 

 

 

1. Uterus:  Anteverted; multiple uterine fibroids with largest at lower myometrium and subserosal = 2
.2 x 2.6 x 1.7cm, couple of Nabothian cysts in CX with larger = 0.4 x 0.4 x 0.2cm.   

2. Endometrium:  thickness is wnl for Day 9 LMP

3. Right Ovary:  multiple follicles with largest = 0.9 x 0.6 x 0.6cm 

4. Left Ovary:  multiple follicles with largest = 1.1 x 1.0 x 1.0cm 

5. Bilateral Adnexa:  wnl

6. Posterior cul-de-sac:  free fluid is noted = 0.4 x 0.4 x 0.2cm.

 

Endometrium is not suspiciously thickened. Multiple uterine fibroids are seen. A 2.6 cm posterior sub
serosal fibroid is noted. Some small nabothian cysts are seen in the cervix. Tiny amount of free flui
d in pelvic cul-de-sac is nonspecific.

 

IMPRESSION: Uterine fibroids are redemonstrated. No suspicious endometrial thickening is seen.

## 2018-03-12 ENCOUNTER — HOSPITAL ENCOUNTER (OUTPATIENT)
Dept: HOSPITAL 47 - LABPAT | Age: 38
Discharge: HOME | End: 2018-03-12
Attending: ANESTHESIOLOGY
Payer: COMMERCIAL

## 2018-03-12 DIAGNOSIS — Z01.812: Primary | ICD-10-CM

## 2018-03-12 LAB
ERYTHROCYTE [DISTWIDTH] IN BLOOD BY AUTOMATED COUNT: 4.01 M/UL (ref 3.8–5.4)
ERYTHROCYTE [DISTWIDTH] IN BLOOD: 17.1 % (ref 11.5–15.5)
HCT VFR BLD AUTO: 27 % (ref 34–46)
HGB BLD-MCNC: 7.5 GM/DL (ref 11.4–16)
MCH RBC QN AUTO: 18.7 PG (ref 25–35)
MCHC RBC AUTO-ENTMCNC: 27.9 G/DL (ref 31–37)
MCV RBC AUTO: 67.3 FL (ref 80–100)
PLATELET # BLD AUTO: 221 K/UL (ref 150–450)
WBC # BLD AUTO: 4.2 K/UL (ref 3.8–10.6)

## 2018-03-12 PROCEDURE — 85027 COMPLETE CBC AUTOMATED: CPT

## 2018-03-12 PROCEDURE — 36415 COLL VENOUS BLD VENIPUNCTURE: CPT

## 2018-03-14 ENCOUNTER — HOSPITAL ENCOUNTER (OUTPATIENT)
Dept: HOSPITAL 47 - OR | Age: 38
End: 2018-03-14
Attending: SURGERY
Payer: COMMERCIAL

## 2018-03-14 VITALS — HEART RATE: 78 BPM | RESPIRATION RATE: 16 BRPM | DIASTOLIC BLOOD PRESSURE: 81 MMHG | SYSTOLIC BLOOD PRESSURE: 124 MMHG

## 2018-03-14 VITALS — BODY MASS INDEX: 24.2 KG/M2

## 2018-03-14 VITALS — TEMPERATURE: 97.8 F

## 2018-03-14 DIAGNOSIS — K42.0: Primary | ICD-10-CM

## 2018-03-14 DIAGNOSIS — Z88.1: ICD-10-CM

## 2018-03-14 DIAGNOSIS — F17.210: ICD-10-CM

## 2018-03-14 DIAGNOSIS — D64.9: ICD-10-CM

## 2018-03-14 DIAGNOSIS — Z88.2: ICD-10-CM

## 2018-03-14 DIAGNOSIS — Z79.899: ICD-10-CM

## 2018-03-14 DIAGNOSIS — Z98.51: ICD-10-CM

## 2018-03-14 PROCEDURE — 86901 BLOOD TYPING SEROLOGIC RH(D): CPT

## 2018-03-14 PROCEDURE — 81025 URINE PREGNANCY TEST: CPT

## 2018-03-14 PROCEDURE — 86850 RBC ANTIBODY SCREEN: CPT

## 2018-03-14 PROCEDURE — 88302 TISSUE EXAM BY PATHOLOGIST: CPT

## 2018-03-14 PROCEDURE — 86900 BLOOD TYPING SEROLOGIC ABO: CPT

## 2018-03-14 PROCEDURE — 49653: CPT

## 2018-03-14 RX ADMIN — POTASSIUM CHLORIDE SCH MLS: 14.9 INJECTION, SOLUTION INTRAVENOUS at 14:12

## 2018-03-14 RX ADMIN — POTASSIUM CHLORIDE SCH MLS: 14.9 INJECTION, SOLUTION INTRAVENOUS at 07:20

## 2018-03-14 NOTE — P.GSHP
History of Present Illness


H&P Date: 18


Chief Complaint: Umbilical and ventral hernia





This is a 29-year-old female who presents today for laparoscopic robotic system 

repair of umbilical and ventral hernia.





Past Medical History


Past Medical History: No Reported History


Additional Past Medical History / Comment(s): Anemia


History of Any Multi-Drug Resistant Organisms: None Reported


Past Surgical History:  Section, Tubal Ligation


Additional Past Surgical History / Comment(s): ganglion cyst removal-left wrist


Past Anesthesia/Blood Transfusion Reactions: No Reported Reaction


Past Psychological History: No Psychological Hx Reported


Smoking Status: Current every day smoker


Past Alcohol Use History: Rare


Additional Past Alcohol Use History / Comment(s): Smokes 6 cigarettes per day 

since age 18.


Past Drug Use History: None Reported





- Past Family History


  ** Mother


Family Medical History: Hypertension





  ** Father


Family Medical History: Cancer





Medications and Allergies


 Home Medications











 Medication  Instructions  Recorded  Confirmed  Type


 


Ferrous Sulfate [Feosol] 325 mg PO BID #60 tab 17 Rx











 Allergies











Allergy/AdvReac Type Severity Reaction Status Date / Time


 


sulfamethoxazole Allergy  Rash/Hives Verified 18 07:04





[From Bactrim]     


 


trimethoprim [From Bactrim] Allergy  Rash/Hives Verified 18 07:04














Surgical - Exam


 Vital Signs











Temp Pulse Resp BP Pulse Ox


 


 98.5 F   71   16   117/63   100 


 


 18 07:09  18 07:09  18 07:09  18 07:09  18 07:09














- General


well developed, no distress





- Eyes


PERRL





- ENT


normal pinna





- Neck


no masses





- Respiratory


normal expansion





- Cardiovascular


Rhythm: regular





- Abdomen





2 cm umbilical hernia, 3 cm ventral hernia located approximately 5 cm above the 

umbilicus.


Abdomen: soft, non tender





Assessment and Plan


Assessment: 


Umbilical and ventral hernia.  We'll perform laparoscopic robotic system repair.

## 2018-03-14 NOTE — P.OP
Date of Procedure: 03/14/18


Preoperative Diagnosis: 


Incarcerated ventral hernia





Umbilical hernia


Postoperative Diagnosis: 


Incarcerated ventral hernia





Umbilical hernia


Procedure(s) Performed: 


Laparoscopic robotic-assisted repair of incarcerated ventral hernia





Laparoscopic robotic system repair of umbilical hernia





Partial omentectomy


Anesthesia: ANATOLIY


Surgeon: Bunny Cruz


Estimated Blood Loss (ml): 5


Pathology: other (Omentum)


Condition: stable


Disposition: PACU


Description of Procedure: 


The patient was placed on the operating table in the supine position.  He 

received general anesthesia.  His abdomen was prepped and draped usual fashion.

  Using a 5 mm optical trocar under direct visualization the peritoneal cavity 

was entered in the left upper quadrant.  The abdomen was then insufflated.  The 

laparoscope was placed back into the perineal cavity.  Next a 8 mm robotic 

trocar was placed in the left lower quadrant and a 12 mm robotic trocar was 

placed in the left lateral position.  The original 5 mm trocar was exchanged 

for a 8 mm robotic trocar.  The patient's placed in the left side up position.  

And the patient was undocked the robot.





The umbilical hernia was visualized.  Using hook cautery the peritoneum over 

the umbilical hernia was excised.  The patient was noted to have an 

incarcerated umbilical hernia located approximately 2 cm above the umbilicus.  

Using hook cautery the incarcerated omentum was removed from the hernia and the 

omentum was transected.  The fascial opening was repaired using 0V LOC suture.  

Next a piece of 11 cm round ventral light ST mesh was placed into the.  Cavity 

and secured with 2 OV lock suture.





The patient was undocked the robot.  The needles were retrieved.  The 

transected with omentum was retrieved.  The fascia of the 12 mm trocar site was 

closed with 0 Ethibond suture.  Skin was closed interrupted 3-0 Monocryl 

suture.  Dermabond dressings was applied.  Patient top procedure well and was 

sent to recovery room stable condition.

## 2018-03-25 ENCOUNTER — HOSPITAL ENCOUNTER (EMERGENCY)
Dept: HOSPITAL 47 - EC | Age: 38
Discharge: HOME | End: 2018-03-25
Payer: COMMERCIAL

## 2018-03-25 VITALS — DIASTOLIC BLOOD PRESSURE: 58 MMHG | HEART RATE: 82 BPM | SYSTOLIC BLOOD PRESSURE: 102 MMHG | TEMPERATURE: 98.2 F

## 2018-03-25 VITALS — RESPIRATION RATE: 18 BRPM

## 2018-03-25 DIAGNOSIS — N39.0: ICD-10-CM

## 2018-03-25 DIAGNOSIS — R51: ICD-10-CM

## 2018-03-25 DIAGNOSIS — Z88.2: ICD-10-CM

## 2018-03-25 DIAGNOSIS — F17.200: ICD-10-CM

## 2018-03-25 DIAGNOSIS — K52.9: Primary | ICD-10-CM

## 2018-03-25 LAB
ALBUMIN SERPL-MCNC: 4 G/DL (ref 3.5–5)
ALP SERPL-CCNC: 49 U/L (ref 38–126)
ALT SERPL-CCNC: 25 U/L (ref 9–52)
ANION GAP SERPL CALC-SCNC: 13 MMOL/L
AST SERPL-CCNC: 29 U/L (ref 14–36)
BUN SERPL-SCNC: 14 MG/DL (ref 7–17)
CALCIUM SPEC-MCNC: 9.2 MG/DL (ref 8.4–10.2)
CELLS COUNTED: 100
CHLORIDE SERPL-SCNC: 100 MMOL/L (ref 98–107)
CO2 SERPL-SCNC: 24 MMOL/L (ref 22–30)
ERYTHROCYTE [DISTWIDTH] IN BLOOD BY AUTOMATED COUNT: 3.91 M/UL (ref 3.8–5.4)
ERYTHROCYTE [DISTWIDTH] IN BLOOD: 18.3 % (ref 11.5–15.5)
GLUCOSE SERPL-MCNC: 83 MG/DL (ref 74–99)
HCT VFR BLD AUTO: 25.9 % (ref 34–46)
HGB BLD-MCNC: 7.1 GM/DL (ref 11.4–16)
KETONES UR QL STRIP.AUTO: (no result)
LYMPHOCYTES # BLD MANUAL: 1.38 K/UL (ref 1–4.8)
MCH RBC QN AUTO: 18.1 PG (ref 25–35)
MCHC RBC AUTO-ENTMCNC: 27.4 G/DL (ref 31–37)
MCV RBC AUTO: 66.2 FL (ref 80–100)
MONOCYTES # BLD MANUAL: 0.44 K/UL (ref 0–1)
NEUTROPHILS NFR BLD MANUAL: 65 %
NEUTS SEG # BLD MANUAL: 3.6 K/UL (ref 1.3–7.7)
PH UR: 6 [PH] (ref 5–8)
PLATELET # BLD AUTO: 222 K/UL (ref 150–450)
POTASSIUM SERPL-SCNC: 3.6 MMOL/L (ref 3.5–5.1)
PROT SERPL-MCNC: 6.8 G/DL (ref 6.3–8.2)
PROT UR QL: (no result)
RBC UR QL: >182 /HPF (ref 0–5)
SODIUM SERPL-SCNC: 137 MMOL/L (ref 137–145)
SP GR UR: 1.02 (ref 1–1.03)
UROBILINOGEN UR QL STRIP: <2 MG/DL (ref ?–2)
WBC # BLD AUTO: 5.5 K/UL (ref 3.8–10.6)
WBC #/AREA URNS HPF: >182 /HPF (ref 0–5)

## 2018-03-25 PROCEDURE — 96375 TX/PRO/DX INJ NEW DRUG ADDON: CPT

## 2018-03-25 PROCEDURE — 87040 BLOOD CULTURE FOR BACTERIA: CPT

## 2018-03-25 PROCEDURE — 96361 HYDRATE IV INFUSION ADD-ON: CPT

## 2018-03-25 PROCEDURE — 85025 COMPLETE CBC W/AUTO DIFF WBC: CPT

## 2018-03-25 PROCEDURE — 80053 COMPREHEN METABOLIC PANEL: CPT

## 2018-03-25 PROCEDURE — 81001 URINALYSIS AUTO W/SCOPE: CPT

## 2018-03-25 PROCEDURE — 71046 X-RAY EXAM CHEST 2 VIEWS: CPT

## 2018-03-25 PROCEDURE — 84484 ASSAY OF TROPONIN QUANT: CPT

## 2018-03-25 PROCEDURE — 96374 THER/PROPH/DIAG INJ IV PUSH: CPT

## 2018-03-25 PROCEDURE — 36415 COLL VENOUS BLD VENIPUNCTURE: CPT

## 2018-03-25 PROCEDURE — 87086 URINE CULTURE/COLONY COUNT: CPT

## 2018-03-25 PROCEDURE — 83605 ASSAY OF LACTIC ACID: CPT

## 2018-03-25 PROCEDURE — 99284 EMERGENCY DEPT VISIT MOD MDM: CPT

## 2018-03-25 PROCEDURE — 93005 ELECTROCARDIOGRAM TRACING: CPT

## 2018-03-25 RX ADMIN — NICARDIPINE HYDROCHLORIDE SCH MLS/HR: 2.5 INJECTION INTRAVENOUS at 04:36

## 2018-03-25 NOTE — XR
EXAM:

  XR Chest, 2 Views

 

CLINICAL HISTORY:

  ITS.REASON XR Reason: Fever

 

TECHNIQUE:

  Frontal and lateral views of the chest.

 

COMPARISON:

  9/20/17

 

FINDINGS:

  Lungs:  Small calcified granulomata.  No consolidation.

  Pleural space:  Unremarkable.  No pneumothorax.

  Heart:  Unremarkable.  No cardiomegaly.

  Mediastinum:  Unremarkable.

  Bones/joints:  Unremarkable.

 

IMPRESSION:     

  No acute radiographic findings.

## 2018-03-25 NOTE — ED
Nausea/Vomiting/Diarrhea HPI





- General


Chief complaint: Nausea/Vomiting/Diarrhea


Stated complaint: Headache


Time Seen by Provider: 18 04:05


Source: patient


Mode of arrival: ambulatory


Limitations: no limitations





- History of Present Illness


Initial comments: 





This patient is 37-year-old woman presenting to be evaluated for nausea, 

vomiting, diarrhea.  She states that the symptoms started between 2-3 days ago.

  She first developed nausea and vomiting and then shortly after began having 

diarrhea.  Yesterday she had 2 episodes of diarrhea without seeing blood or 

melena.  She had about 4 episodes of vomiting without seeing blood or coffee-

ground material.  She is not having any associated abdominal pain.  Over the 

course of tonight she has also been having a little bit of mild diffuse 

headache.  The patient does note that she had a recent umbilical hernia repair, 

but she had not been having any abdominal pain.  She had been prescribed 

hydrocodone following surgery but has not been using it.  She did take one 

today to see if it would help with the symptoms and it did not relieve the 

headache much.


MD complaint: nausea, vomiting, diarrhea


Onset/Timin


-: days(s)


Description of Vomiting: watery


Description of Diarrhea: water


Associated Abdominal Pain: No


Improves with: none


Worsens with: none


Context: history of abdominal surgery


Associated Symptoms: denies other symptoms





- Related Data


 Previous Rx's











 Medication  Instructions  Recorded


 


Docusate [Colace] 100 mg PO BID #20 capsule 18


 


HYDROcodone/APAP 7.5-325MG [Norco 1 each PO Q4H PRN #30 tab 18





7.5]  


 


Ciprofloxacin HCl [Cipro] 500 mg PO Q12HR #14 tablet 18


 


Ondansetron Odt [Zofran ODT] 4 mg PO Q8HR PRN #10 tab 18











 Allergies











Allergy/AdvReac Type Severity Reaction Status Date / Time


 


sulfamethoxazole Allergy  Rash/Hives Verified 18 07:04





[From Bactrim]     


 


trimethoprim [From Bactrim] Allergy  Rash/Hives Verified 18 07:04














Review of Systems


ROS Statement: 


Those systems with pertinent positive or pertinent negative responses have been 

documented in the HPI.





ROS Other: All systems not noted in ROS Statement are negative.


Constitutional: Reports: fever.  Denies: chills


Eyes: Denies: vision change


Respiratory: Denies: cough, dyspnea


Cardiovascular: Denies: chest pain, palpitations, syncope


Gastrointestinal: Reports: nausea, vomiting, diarrhea.  Denies: abdominal pain, 

hematemesis, melena, hematochezia


Genitourinary: Denies: dysuria, hematuria


Musculoskeletal: Denies: back pain


Skin: Denies: rash


Neurological: Reports: as per HPI, headache.  Denies: weakness, numbness, 

paresthesias


Hematological/Lymphatic: Reports: other (Chronic anemia).  Denies: easy bleeding





Past Medical History


Past Medical History: No Reported History


Additional Past Medical History / Comment(s): Anemia


History of Any Multi-Drug Resistant Organisms: None Reported


Past Surgical History:  Section, Tubal Ligation


Additional Past Surgical History / Comment(s): ganglion cyst removal-left wrist


Past Anesthesia/Blood Transfusion Reactions: No Reported Reaction


Past Psychological History: No Psychological Hx Reported


Smoking Status: Current every day smoker


Past Alcohol Use History: Occasional


Past Drug Use History: None Reported





- Past Family History


  ** Mother


Family Medical History: Hypertension





  ** Father


Family Medical History: Cancer





General Exam


Limitations: no limitations


General appearance: alert, in no apparent distress


Head exam: Present: atraumatic, normocephalic


Eye exam: Present: normal appearance, other (Pallor).  Absent: scleral icterus, 

conjunctival injection


ENT exam: Present: normal oropharynx, mucous membranes moist


Neck exam: Present: full ROM.  Absent: meningismus


Respiratory exam: Present: normal lung sounds bilaterally.  Absent: respiratory 

distress, wheezes, rales, rhonchi, stridor


Cardiovascular Exam: Present: regular rate, normal rhythm, normal heart sounds.

  Absent: systolic murmur, diastolic murmur, rubs, gallop


GI/Abdominal exam: Present: soft, normal bowel sounds.  Absent: distended, 

tenderness, guarding, rebound, rigid, mass, pulsatile mass


Extremities exam: Present: normal inspection, normal capillary refill.  Absent: 

pedal edema, calf tenderness


Skin exam: Present: warm, dry, intact, normal color.  Absent: rash





Course


 Vital Signs











  18





  03:56 05:50


 


Temperature 100.7 F H 99.3 F


 


Pulse Rate 103 H 83


 


Respiratory 20 18





Rate  


 


Blood Pressure 98/56 111/56


 


O2 Sat by Pulse 95 94 L





Oximetry  














Medical Decision Making





- Lab Data


Result diagrams: 


 18 04:18





 18 04:18


 Lab Results











  18 Range/Units





  04:18 04:18 04:18 


 


WBC  5.5    (3.8-10.6)  k/uL


 


RBC  3.91    (3.80-5.40)  m/uL


 


Hgb  7.1 L    (11.4-16.0)  gm/dL


 


Hct  25.9 L    (34.0-46.0)  %


 


MCV  66.2 L    (80.0-100.0)  fL


 


MCH  18.1 L    (25.0-35.0)  pg


 


MCHC  27.4 L    (31.0-37.0)  g/dL


 


RDW  18.3 H    (11.5-15.5)  %


 


Plt Count  222    (150-450)  k/uL


 


Sodium   137   (137-145)  mmol/L


 


Potassium   3.6   (3.5-5.1)  mmol/L


 


Chloride   100   ()  mmol/L


 


Carbon Dioxide   24   (22-30)  mmol/L


 


Anion Gap   13   mmol/L


 


BUN   14   (7-17)  mg/dL


 


Creatinine   0.60   (0.52-1.04)  mg/dL


 


Est GFR (CKD-EPI)AfAm   >90   (>60 ml/min/1.73 sqM)  


 


Est GFR (CKD-EPI)NonAf   >90   (>60 ml/min/1.73 sqM)  


 


Glucose   83   (74-99)  mg/dL


 


Plasma Lactic Acid Shilo    1.1  (0.7-2.0)  mmol/L


 


Calcium   9.2   (8.4-10.2)  mg/dL


 


Total Bilirubin   0.3   (0.2-1.3)  mg/dL


 


AST   29   (14-36)  U/L


 


ALT   25   (9-52)  U/L


 


Alkaline Phosphatase   49   ()  U/L


 


Troponin I     (0.000-0.034)  ng/mL


 


Total Protein   6.8   (6.3-8.2)  g/dL


 


Albumin   4.0   (3.5-5.0)  g/dL


 


Urine Color     


 


Urine Appearance     (Clear)  


 


Urine pH     (5.0-8.0)  


 


Ur Specific Gravity     (1.001-1.035)  


 


Urine Protein     (Negative)  


 


Urine Glucose (UA)     (Negative)  


 


Urine Ketones     (Negative)  


 


Urine Blood     (Negative)  


 


Urine Nitrite     (Negative)  


 


Urine Bilirubin     (Negative)  


 


Urine Urobilinogen     (<2.0)  mg/dL


 


Ur Leukocyte Esterase     (Negative)  


 


Urine RBC     (0-5)  /hpf


 


Urine WBC     (0-5)  /hpf














  18 Range/Units





  04:18 04:38 


 


WBC    (3.8-10.6)  k/uL


 


RBC    (3.80-5.40)  m/uL


 


Hgb    (11.4-16.0)  gm/dL


 


Hct    (34.0-46.0)  %


 


MCV    (80.0-100.0)  fL


 


MCH    (25.0-35.0)  pg


 


MCHC    (31.0-37.0)  g/dL


 


RDW    (11.5-15.5)  %


 


Plt Count    (150-450)  k/uL


 


Sodium    (137-145)  mmol/L


 


Potassium    (3.5-5.1)  mmol/L


 


Chloride    ()  mmol/L


 


Carbon Dioxide    (22-30)  mmol/L


 


Anion Gap    mmol/L


 


BUN    (7-17)  mg/dL


 


Creatinine    (0.52-1.04)  mg/dL


 


Est GFR (CKD-EPI)AfAm    (>60 ml/min/1.73 sqM)  


 


Est GFR (CKD-EPI)NonAf    (>60 ml/min/1.73 sqM)  


 


Glucose    (74-99)  mg/dL


 


Plasma Lactic Acid Shilo    (0.7-2.0)  mmol/L


 


Calcium    (8.4-10.2)  mg/dL


 


Total Bilirubin    (0.2-1.3)  mg/dL


 


AST    (14-36)  U/L


 


ALT    (9-52)  U/L


 


Alkaline Phosphatase    ()  U/L


 


Troponin I  <0.012   (0.000-0.034)  ng/mL


 


Total Protein    (6.3-8.2)  g/dL


 


Albumin    (3.5-5.0)  g/dL


 


Urine Color   Red  


 


Urine Appearance   Cloudy H  (Clear)  


 


Urine pH   6.0  (5.0-8.0)  


 


Ur Specific Gravity   1.020  (1.001-1.035)  


 


Urine Protein   2+ H  (Negative)  


 


Urine Glucose (UA)   Negative  (Negative)  


 


Urine Ketones   1+ H  (Negative)  


 


Urine Blood   Large H  (Negative)  


 


Urine Nitrite   Negative  (Negative)  


 


Urine Bilirubin   Negative  (Negative)  


 


Urine Urobilinogen   <2.0  (<2.0)  mg/dL


 


Ur Leukocyte Esterase   Small H  (Negative)  


 


Urine RBC   >182 H  (0-5)  /hpf


 


Urine WBC   >182 H  (0-5)  /hpf














- EKG Data


-: EKG Interpreted by Me


EKG shows normal: sinus rhythm (With sinus arrhythmia), axis (Normal), 

intervals (Normal), QRS complexes (Normal), ST-T waves (Normal)


Rate: normal (Rate approximately 90 bpm)


Interpretation: normal EKG





Disposition


Clinical Impression: 


 Urinary tract infection, Gastroenteritis





Disposition: HOME SELF-CARE


Condition: Good


Instructions:  Urinary Tract Infection in Women (ED), Gastroenteritis (ED)


Prescriptions: 


Ciprofloxacin HCl [Cipro] 500 mg PO Q12HR #14 tablet


Ondansetron Odt [Zofran ODT] 4 mg PO Q8HR PRN #10 tab


 PRN Reason: Nausea


Referrals: 


Pankaj Resendiz MD [Primary Care Provider] - 1-2 days

## 2018-05-03 ENCOUNTER — HOSPITAL ENCOUNTER (OUTPATIENT)
Dept: HOSPITAL 47 - EC | Age: 38
Setting detail: OBSERVATION
LOS: 1 days | Discharge: HOME | End: 2018-05-04
Attending: INTERNAL MEDICINE | Admitting: INTERNAL MEDICINE
Payer: COMMERCIAL

## 2018-05-03 VITALS — BODY MASS INDEX: 24.2 KG/M2

## 2018-05-03 DIAGNOSIS — Z80.9: ICD-10-CM

## 2018-05-03 DIAGNOSIS — Z82.49: ICD-10-CM

## 2018-05-03 DIAGNOSIS — Z79.899: ICD-10-CM

## 2018-05-03 DIAGNOSIS — Z88.2: ICD-10-CM

## 2018-05-03 DIAGNOSIS — N92.0: ICD-10-CM

## 2018-05-03 DIAGNOSIS — R19.7: ICD-10-CM

## 2018-05-03 DIAGNOSIS — R10.9: Primary | ICD-10-CM

## 2018-05-03 DIAGNOSIS — D50.0: ICD-10-CM

## 2018-05-03 DIAGNOSIS — R07.89: ICD-10-CM

## 2018-05-03 DIAGNOSIS — F17.210: ICD-10-CM

## 2018-05-03 LAB
ALBUMIN SERPL-MCNC: 4.1 G/DL (ref 3.5–5)
ALP SERPL-CCNC: 53 U/L (ref 38–126)
ALT SERPL-CCNC: 10 U/L (ref 9–52)
AMYLASE SERPL-CCNC: 73 U/L (ref 30–110)
ANION GAP SERPL CALC-SCNC: 12 MMOL/L
AST SERPL-CCNC: 19 U/L (ref 14–36)
BASOPHILS # BLD AUTO: 0 K/UL (ref 0–0.2)
BASOPHILS NFR BLD AUTO: 1 %
BUN SERPL-SCNC: 13 MG/DL (ref 7–17)
CALCIUM SPEC-MCNC: 9.3 MG/DL (ref 8.4–10.2)
CHLORIDE SERPL-SCNC: 107 MMOL/L (ref 98–107)
CK SERPL-CCNC: 39 U/L (ref 30–135)
CO2 SERPL-SCNC: 25 MMOL/L (ref 22–30)
EOSINOPHIL # BLD AUTO: 0.1 K/UL (ref 0–0.7)
EOSINOPHIL NFR BLD AUTO: 3 %
ERYTHROCYTE [DISTWIDTH] IN BLOOD BY AUTOMATED COUNT: 3.88 M/UL (ref 3.8–5.4)
ERYTHROCYTE [DISTWIDTH] IN BLOOD: 17.4 % (ref 11.5–15.5)
GLUCOSE SERPL-MCNC: 86 MG/DL (ref 74–99)
HCT VFR BLD AUTO: 25.4 % (ref 34–46)
HGB BLD-MCNC: 6.4 GM/DL (ref 11.4–16)
LIPASE SERPL-CCNC: 71 U/L (ref 23–300)
LYMPHOCYTES # SPEC AUTO: 1.7 K/UL (ref 1–4.8)
LYMPHOCYTES NFR SPEC AUTO: 47 %
MCH RBC QN AUTO: 16.5 PG (ref 25–35)
MCHC RBC AUTO-ENTMCNC: 25.2 G/DL (ref 31–37)
MCV RBC AUTO: 65.5 FL (ref 80–100)
MONOCYTES # BLD AUTO: 0.3 K/UL (ref 0–1)
MONOCYTES NFR BLD AUTO: 7 %
NEUTROPHILS # BLD AUTO: 1.4 K/UL (ref 1.3–7.7)
NEUTROPHILS NFR BLD AUTO: 39 %
PH UR: 7 [PH] (ref 5–8)
PLATELET # BLD AUTO: 222 K/UL (ref 150–450)
POTASSIUM SERPL-SCNC: 3.9 MMOL/L (ref 3.5–5.1)
PROT SERPL-MCNC: 6.7 G/DL (ref 6.3–8.2)
RBC UR QL: 2 /HPF (ref 0–5)
SODIUM SERPL-SCNC: 144 MMOL/L (ref 137–145)
SP GR UR: 1.01 (ref 1–1.03)
SQUAMOUS UR QL AUTO: 1 /HPF (ref 0–4)
TROPONIN I SERPL-MCNC: <0.012 NG/ML (ref 0–0.03)
UROBILINOGEN UR QL STRIP: <2 MG/DL (ref ?–2)
WBC # BLD AUTO: 3.6 K/UL (ref 3.8–10.6)
WBC #/AREA URNS HPF: 1 /HPF (ref 0–5)

## 2018-05-03 PROCEDURE — 81001 URINALYSIS AUTO W/SCOPE: CPT

## 2018-05-03 PROCEDURE — 71046 X-RAY EXAM CHEST 2 VIEWS: CPT

## 2018-05-03 PROCEDURE — 82553 CREATINE MB FRACTION: CPT

## 2018-05-03 PROCEDURE — 36415 COLL VENOUS BLD VENIPUNCTURE: CPT

## 2018-05-03 PROCEDURE — 86920 COMPATIBILITY TEST SPIN: CPT

## 2018-05-03 PROCEDURE — 85025 COMPLETE CBC W/AUTO DIFF WBC: CPT

## 2018-05-03 PROCEDURE — 84443 ASSAY THYROID STIM HORMONE: CPT

## 2018-05-03 PROCEDURE — 74018 RADEX ABDOMEN 1 VIEW: CPT

## 2018-05-03 PROCEDURE — 93005 ELECTROCARDIOGRAM TRACING: CPT

## 2018-05-03 PROCEDURE — 96361 HYDRATE IV INFUSION ADD-ON: CPT

## 2018-05-03 PROCEDURE — 86850 RBC ANTIBODY SCREEN: CPT

## 2018-05-03 PROCEDURE — 80053 COMPREHEN METABOLIC PANEL: CPT

## 2018-05-03 PROCEDURE — 99285 EMERGENCY DEPT VISIT HI MDM: CPT

## 2018-05-03 PROCEDURE — 82550 ASSAY OF CK (CPK): CPT

## 2018-05-03 PROCEDURE — 83690 ASSAY OF LIPASE: CPT

## 2018-05-03 PROCEDURE — 86901 BLOOD TYPING SEROLOGIC RH(D): CPT

## 2018-05-03 PROCEDURE — 96374 THER/PROPH/DIAG INJ IV PUSH: CPT

## 2018-05-03 PROCEDURE — 84484 ASSAY OF TROPONIN QUANT: CPT

## 2018-05-03 PROCEDURE — 86900 BLOOD TYPING SEROLOGIC ABO: CPT

## 2018-05-03 PROCEDURE — 36430 TRANSFUSION BLD/BLD COMPNT: CPT

## 2018-05-03 PROCEDURE — 82150 ASSAY OF AMYLASE: CPT

## 2018-05-03 PROCEDURE — 81025 URINE PREGNANCY TEST: CPT

## 2018-05-03 NOTE — XR
EXAM:

  XR Chest, 2 Views

 

CLINICAL HISTORY:

 Pain

 

TECHNIQUE:

  Frontal and lateral views of the chest.

 

COMPARISON:

  3/25/18

 

FINDINGS:

  Lungs:  Unremarkable.  No consolidation.

  Pleural space:  Unremarkable.  No pneumothorax.

  Heart:  Unremarkable.  No cardiomegaly.

  Mediastinum:  Unremarkable.

  Bones/joints:  Unremarkable.

 

IMPRESSION:     

No acute findings or change

## 2018-05-03 NOTE — XR
EXAM:

  XR Abdomen, 1 View

 

CLINICAL HISTORY:

  abdominal pain

 

TECHNIQUE:

  Frontal supine view of the abdomen/pelvis.

 

COMPARISON:

  No relevant prior studies available.

 

FINDINGS:

  Gastrointestinal tract:  Nonspecific bowel gas pattern.  No dilation.

  Bones/joints:  Unremarkable.

 

IMPRESSION:     

  No acute findings.

## 2018-05-03 NOTE — ED
General Adult HPI





- General


Chief complaint: Abdominal Pain


Stated complaint: abdominal pain/chest pressure


Time Seen by Provider: 18 21:33


Source: patient


Mode of arrival: ambulatory


Limitations: no limitations





- History of Present Illness


Initial comments: 


37-year-old female patient with past medical history significant for anemia 

with heavy periods presents to the emergency department today for evaluation of 

chest discomfort.  Patient states approximately an hour ago she was driving in 

the car when she had sudden onset of severe abdominal cramping that moved into 

her chest.  Patient states that the abdominal pain has resolved, but she 

continues to have chest discomfort.  Patient denies any history of chest pain 

or similar symptoms. She denies any nausea or vomiting with this.  Denies any 

sweats, shortness of breath, dizziness, or weakness.  Patient reports having a 

headache currently.  She denies any recent cough, congestion, fever, or chills. 

Patient denies any recent rash, diarrhea, constipation, hematochezia, melena, 

back pain, numbness, tingling, hematuria, dysuria, urinary urgency, urinary 

frequency, visual changes, or any other complaints.








- Related Data


 Home Medications











 Medication  Instructions  Recorded  Confirmed


 


No Known Home Medications [No  18





Known Home Medications]   











 Allergies











Allergy/AdvReac Type Severity Reaction Status Date / Time


 


sulfamethoxazole Allergy  Rash/Hives Verified 18 21:46





[From Bactrim]     


 


trimethoprim [From Bactrim] Allergy  Rash/Hives Verified 18 21:46














Review of Systems


ROS Statement: 


Those systems with pertinent positive or pertinent negative responses have been 

documented in the HPI.





ROS Other: All systems not noted in ROS Statement are negative.





Past Medical History


Past Medical History: No Reported History


Additional Past Medical History / Comment(s): Anemia


History of Any Multi-Drug Resistant Organisms: None Reported


Past Surgical History:  Section, Hernia Repair, Tubal Ligation


Additional Past Surgical History / Comment(s): ganglion cyst removal-left wrist


Past Anesthesia/Blood Transfusion Reactions: No Reported Reaction


Past Psychological History: No Psychological Hx Reported


Smoking Status: Current every day smoker


Past Alcohol Use History: Occasional


Past Drug Use History: None Reported





- Past Family History


  ** Mother


Family Medical History: Hypertension





  ** Father


Family Medical History: Cancer





General Exam


Limitations: no limitations


General appearance: alert, in no apparent distress, other (This is a well-

developed, well-nourished adult female patient in no acute distress.  Vital 

signs upon presentation are temperature 98.3F, pulse 99, respirations 16, 

blood pressure 111/63, pulse ox 100% on room air.)


Eye exam: Present: normal appearance, PERRL, EOMI, other (Pale Conjunctiva).  

Absent: scleral icterus, conjunctival injection, periorbital swelling


ENT exam: Present: normal exam, normal oropharynx, mucous membranes moist


Respiratory exam: Present: normal lung sounds bilaterally.  Absent: respiratory 

distress, wheezes, rales, rhonchi, stridor


Cardiovascular Exam: Present: regular rate, normal rhythm, normal heart sounds.

  Absent: systolic murmur, diastolic murmur, rubs, gallop, clicks


GI/Abdominal exam: Present: soft, normal bowel sounds.  Absent: distended, 

tenderness, guarding, rebound, rigid


Neurological exam: Present: alert, oriented X3, CN II-XII intact


Psychiatric exam: Present: normal affect, normal mood


Skin exam: Present: warm, dry, intact, normal color.  Absent: rash





Course


 Vital Signs











  18





  21:20 23:16


 


Temperature 98.3 F 97.9 F


 


Pulse Rate 99 71


 


Respiratory 16 18





Rate  


 


Blood Pressure 111/63 93/51


 


O2 Sat by Pulse 100 100





Oximetry  














EKG Findings





- EKG Comments:


EKG Findings:: EKG obtained at 2207 shows normal sinus rhythm with a 

ventricular rate of 76, P return to 158, QR restoration 88, , .  

No evidence of ST elevation or depression.





Medical Decision Making





- Medical Decision Making


37-year-old female patient with a past medical history significant for anemia 

with heavy periods presents to the emergency department today for complaints of 

chest discomfort.  Physical examination is unremarkable.  Lungs are clear to 

auscultation with good air movement.  Abdomen is soft and nontender.  Labs 

reviewed and did show a hemoglobin of 6.4.  Patient states that her hemoglobin 

has been this low in the past and she has had have blood transfusions.  Chest x-

ray showed no acute cardiopulmonary process.  Other labs are unremarkable.  We 

will admit for observation, we'll transfuse one unit and repeat cardiac labs. 

[facility] is accepting. 








- Lab Data


Result diagrams: 


 18 21:41





 18 21:41


 Lab Results











  18/18 Range/Units





  21:41 21:41 21:41 


 


WBC   3.6 L   (3.8-10.6)  k/uL


 


RBC   3.88   (3.80-5.40)  m/uL


 


Hgb   6.4 L*   (11.4-16.0)  gm/dL


 


Hct   25.4 L   (34.0-46.0)  %


 


MCV   65.5 L   (80.0-100.0)  fL


 


MCH   16.5 L   (25.0-35.0)  pg


 


MCHC   25.2 L   (31.0-37.0)  g/dL


 


RDW   17.4 H   (11.5-15.5)  %


 


Plt Count   222   (150-450)  k/uL


 


Neutrophils %   39   %


 


Lymphocytes %   47   %


 


Monocytes %   7   %


 


Eosinophils %   3   %


 


Basophils %   1   %


 


Neutrophils #   1.4   (1.3-7.7)  k/uL


 


Lymphocytes #   1.7   (1.0-4.8)  k/uL


 


Monocytes #   0.3   (0-1.0)  k/uL


 


Eosinophils #   0.1   (0-0.7)  k/uL


 


Basophils #   0.0   (0-0.2)  k/uL


 


Hypochromasia   Marked   


 


Anisocytosis   Slight   


 


Microcytosis   Marked   


 


Sodium  144    (137-145)  mmol/L


 


Potassium  3.9    (3.5-5.1)  mmol/L


 


Chloride  107    ()  mmol/L


 


Carbon Dioxide  25    (22-30)  mmol/L


 


Anion Gap  12    mmol/L


 


BUN  13    (7-17)  mg/dL


 


Creatinine  0.70    (0.52-1.04)  mg/dL


 


Est GFR (CKD-EPI)AfAm  >90    (>60 ml/min/1.73 sqM)  


 


Est GFR (CKD-EPI)NonAf  >90    (>60 ml/min/1.73 sqM)  


 


Glucose  86    (74-99)  mg/dL


 


Calcium  9.3    (8.4-10.2)  mg/dL


 


Total Bilirubin  0.2    (0.2-1.3)  mg/dL


 


AST  19    (14-36)  U/L


 


ALT  10    (9-52)  U/L


 


Alkaline Phosphatase  53    ()  U/L


 


Total Creatine Kinase    39  ()  U/L


 


CK-MB (CK-2)    <0.2  (0.0-2.4)  ng/mL


 


CK-MB (CK-2) Rel Index      


 


Troponin I    <0.012  (0.000-0.034)  ng/mL


 


Total Protein  6.7    (6.3-8.2)  g/dL


 


Albumin  4.1    (3.5-5.0)  g/dL


 


Amylase  73    ()  U/L


 


Lipase  71    ()  U/L


 


Urine Color     


 


Urine Appearance     (Clear)  


 


Urine pH     (5.0-8.0)  


 


Ur Specific Gravity     (1.001-1.035)  


 


Urine Protein     (Negative)  


 


Urine Glucose (UA)     (Negative)  


 


Urine Ketones     (Negative)  


 


Urine Blood     (Negative)  


 


Urine Nitrite     (Negative)  


 


Urine Bilirubin     (Negative)  


 


Urine Urobilinogen     (<2.0)  mg/dL


 


Ur Leukocyte Esterase     (Negative)  


 


Urine RBC     (0-5)  /hpf


 


Urine WBC     (0-5)  /hpf


 


Ur Squamous Epith Cells     (0-4)  /hpf


 


Amorphous Sediment     (None)  /hpf


 


Urine Mucus     (None)  /hpf


 


Urine HCG, Qual     (Not Detectd)  














  18 Range/Units





  21:41 21:41 


 


WBC    (3.8-10.6)  k/uL


 


RBC    (3.80-5.40)  m/uL


 


Hgb    (11.4-16.0)  gm/dL


 


Hct    (34.0-46.0)  %


 


MCV    (80.0-100.0)  fL


 


MCH    (25.0-35.0)  pg


 


MCHC    (31.0-37.0)  g/dL


 


RDW    (11.5-15.5)  %


 


Plt Count    (150-450)  k/uL


 


Neutrophils %    %


 


Lymphocytes %    %


 


Monocytes %    %


 


Eosinophils %    %


 


Basophils %    %


 


Neutrophils #    (1.3-7.7)  k/uL


 


Lymphocytes #    (1.0-4.8)  k/uL


 


Monocytes #    (0-1.0)  k/uL


 


Eosinophils #    (0-0.7)  k/uL


 


Basophils #    (0-0.2)  k/uL


 


Hypochromasia    


 


Anisocytosis    


 


Microcytosis    


 


Sodium    (137-145)  mmol/L


 


Potassium    (3.5-5.1)  mmol/L


 


Chloride    ()  mmol/L


 


Carbon Dioxide    (22-30)  mmol/L


 


Anion Gap    mmol/L


 


BUN    (7-17)  mg/dL


 


Creatinine    (0.52-1.04)  mg/dL


 


Est GFR (CKD-EPI)AfAm    (>60 ml/min/1.73 sqM)  


 


Est GFR (CKD-EPI)NonAf    (>60 ml/min/1.73 sqM)  


 


Glucose    (74-99)  mg/dL


 


Calcium    (8.4-10.2)  mg/dL


 


Total Bilirubin    (0.2-1.3)  mg/dL


 


AST    (14-36)  U/L


 


ALT    (9-52)  U/L


 


Alkaline Phosphatase    ()  U/L


 


Total Creatine Kinase    ()  U/L


 


CK-MB (CK-2)    (0.0-2.4)  ng/mL


 


CK-MB (CK-2) Rel Index    


 


Troponin I    (0.000-0.034)  ng/mL


 


Total Protein    (6.3-8.2)  g/dL


 


Albumin    (3.5-5.0)  g/dL


 


Amylase    ()  U/L


 


Lipase    ()  U/L


 


Urine Color  Yellow   


 


Urine Appearance  Cloudy H   (Clear)  


 


Urine pH  7.0   (5.0-8.0)  


 


Ur Specific Gravity  1.013   (1.001-1.035)  


 


Urine Protein  Negative   (Negative)  


 


Urine Glucose (UA)  Negative   (Negative)  


 


Urine Ketones  Negative   (Negative)  


 


Urine Blood  Negative   (Negative)  


 


Urine Nitrite  Negative   (Negative)  


 


Urine Bilirubin  Negative   (Negative)  


 


Urine Urobilinogen  <2.0   (<2.0)  mg/dL


 


Ur Leukocyte Esterase  Negative   (Negative)  


 


Urine RBC  2   (0-5)  /hpf


 


Urine WBC  1   (0-5)  /hpf


 


Ur Squamous Epith Cells  1   (0-4)  /hpf


 


Amorphous Sediment  Occasional H   (None)  /hpf


 


Urine Mucus  Rare H   (None)  /hpf


 


Urine HCG, Qual   Not Detected  (Not Detectd)  














- Radiology Data


Radiology results: report reviewed, image reviewed


Two-view x-ray of the chest shows the lungs are unremarkable with no 

consolidation.  Pleural spaces unremarkable no pneumothorax.  Heart is 

unremarkable no cardiomegaly.  Mediastinum is unremarkable, bones and joints 

are unremarkable.  Impression by Dr. Zamorano shows no acute findings or change.





KUB x-ray of the abdomen shows nonspecific bowel gas pattern.  No dilation.  

Bones and ribs unremarkable.  Impression by Dr. Zamorano shows no acute findings.





Disposition


Clinical Impression: 


 Anemia, Chest pain





Disposition: ADMITTED AS IP TO THIS hospitals


Condition: Serious


Referrals: 


Pankaj Resendiz MD [Primary Care Provider] - 1-2 days


Decision to Admit Reason: Admit from EC


Decision Date: 18


Decision Time: 23:18

## 2018-05-04 VITALS — SYSTOLIC BLOOD PRESSURE: 100 MMHG | DIASTOLIC BLOOD PRESSURE: 53 MMHG | HEART RATE: 63 BPM | TEMPERATURE: 98.2 F

## 2018-05-04 VITALS — RESPIRATION RATE: 16 BRPM

## 2018-05-04 LAB
BASOPHILS # BLD AUTO: 0 K/UL (ref 0–0.2)
BASOPHILS NFR BLD AUTO: 0 %
EOSINOPHIL # BLD AUTO: 0.1 K/UL (ref 0–0.7)
EOSINOPHIL NFR BLD AUTO: 4 %
ERYTHROCYTE [DISTWIDTH] IN BLOOD BY AUTOMATED COUNT: 3.72 M/UL (ref 3.8–5.4)
ERYTHROCYTE [DISTWIDTH] IN BLOOD: 18.3 % (ref 11.5–15.5)
HCT VFR BLD AUTO: 25.3 % (ref 34–46)
HGB BLD-MCNC: 6.9 GM/DL (ref 11.4–16)
LYMPHOCYTES # SPEC AUTO: 1.4 K/UL (ref 1–4.8)
LYMPHOCYTES NFR SPEC AUTO: 48 %
MCH RBC QN AUTO: 18.5 PG (ref 25–35)
MCHC RBC AUTO-ENTMCNC: 27.1 G/DL (ref 31–37)
MCV RBC AUTO: 68.1 FL (ref 80–100)
MONOCYTES # BLD AUTO: 0.2 K/UL (ref 0–1)
MONOCYTES NFR BLD AUTO: 8 %
NEUTROPHILS # BLD AUTO: 1.1 K/UL (ref 1.3–7.7)
NEUTROPHILS NFR BLD AUTO: 37 %
PLATELET # BLD AUTO: 193 K/UL (ref 150–450)
WBC # BLD AUTO: 2.9 K/UL (ref 3.8–10.6)

## 2018-05-04 PROCEDURE — 30233N1 TRANSFUSION OF NONAUTOLOGOUS RED BLOOD CELLS INTO PERIPHERAL VEIN, PERCUTANEOUS APPROACH: ICD-10-PCS

## 2018-05-04 NOTE — P.HPIM
History of Present Illness


H&P Date: 18


Chief Complaint: Lower abdominal cramps and chest pain





37-year-old female patient with past medical history significant for anemia 

with heavy periods presents to the emergency department today for evaluation of 

chest discomfort.  Patient states approximately an hour ago she was driving in 

the car when she had sudden onset of severe abdominal cramping that moved up 

into her chest.  Patient states that the abdominal pain has resolved, but she 

continues to have chest discomfort which made her to come to the hospital..  

Patient denies any history of chest pain or similar symptoms. She denies any 

nausea or vomiting with this.  Denies any sweats, shortness of breath, dizziness

, or weakness. 





She denies any recent cough, congestion, fever, or chills. Patient denies any 

recent rash, diarrhea, constipation, hematochezia, melena, back pain, numbness, 

tingling, hematuria, dysuria, urinary urgency, urinary frequency, visual changes

, or any other complaints.


Patient says that she ate hamburger along with symptoms prior to getting her 

abdominal cramps.  Patient developed diarrhea last night which seems to be 

subsided now.





Chest x-ray and KUB x-ray were negative on admission.





Patient recently had laparoscopic hernia repair





Review of Systems





Constitutional: Patient denies any fever or chills .  No generalized weakness 

or weight loss.  


Abdomen: Patient does have abdominal cramps and diarrhea.  No nausea no 

vomiting no constipation


Cardiovascular: Patient denies any chest pain or short of breath no 

palpitations.


Respiratory: patient denied any cough is from production.  No shortness of 

breath


Neurologic: Patient denied any numbness or tingling headache.


Musculoskeletal: Patient denies any complaints of joint swelling or deformity.


Skin: Negative


Psychiatric: Negative


Endocrine: No heat or cold intolerance.  No recent weight gain.


Genitourinary: No dysuria or hematuria.


All other 14 point ROS negative except the above





Past Medical History


Past Medical History: No Reported History


Additional Past Medical History / Comment(s): Anemia secondary to menorrhagia


History of Any Multi-Drug Resistant Organisms: None Reported


Past Surgical History:  Section, Hernia Repair, Tubal Ligation


Additional Past Surgical History / Comment(s): ganglion cyst removal-left wrist


Past Anesthesia/Blood Transfusion Reactions: No Reported Reaction


Past Psychological History: No Psychological Hx Reported


Smoking Status: Current every day smoker


Past Alcohol Use History: Occasional


Additional Past Alcohol Use History / Comment(s): Smokes 6 cigarettes per day 

since age 18.


Past Drug Use History: None Reported





- Past Family History


  ** Mother


Family Medical History: Hypertension





  ** Father


Family Medical History: Cancer





Medications and Allergies


 Home Medications











 Medication  Instructions  Recorded  Confirmed  Type


 


Ferrous Sulfate [Feosol] 325 mg PO TID 30 Days #90 tab 18  Rx











 Allergies











Allergy/AdvReac Type Severity Reaction Status Date / Time


 


sulfamethoxazole Allergy  Rash/Hives Verified 18 21:46





[From Bactrim]     


 


trimethoprim [From Bactrim] Allergy  Rash/Hives Verified 18 21:46














Physical Exam


Vitals: 


 Vital Signs











  Temp Pulse Pulse Resp BP BP Pulse Ox


 


 18 12:00  98.2 F   63  16   100/53  100


 


 18 08:00  98.4 F   70  16   102/51  100


 


 18 04:45  98.3 F  65   16  92/53   100


 


 18 02:55  98.0 F  78   16  92/53   100


 


 18 02:50    83  16   


 


 18 02:25  97.8 F  78   16  96/47   100


 


 18 02:15  98.1 F  78   16  93/43   100


 


 18 00:44  98.3 F   66  16   108/66  100


 


 18 00:17  98.0 F      


 


 18 00:04   70   18  103/59   99


 


 18 23:16  97.9 F  71   18  93/51   100


 


 18 21:20  98.3 F  99   16  111/63   100








 Intake and Output











 18





 22:59 06:59 14:59


 


Intake Total  310 236


 


Balance  310 236


 


Intake:   


 


  Oral   236


 


  Blood Product  310 


 


    Rc As-1  Unit  310 





    [id number]   


 


Other:   


 


  Voiding Method  Toilet Toilet


 


  # Voids  3 


 


  Weight 68.039 kg 68.03 kg 














PHYSICAL EXAMINATION: 


Patient is lying in the bed comfortably, no acute distress, awake alert and 

oriented.. 


HEENT: Normocephalic. Neck is supple. Pupils reactive. Nostrils clear. Oral 

cavity is moist. Ears reveal no drainage. 


Neck reveals no JVD, carotid bruits, or thyromegaly. 


CHEST EXAMINATION: Trachea is central. Symmetrical expansion. Lung fields clear 

to auscultation and percussion. 


CARDIAC: Normal S1, S2 with no gallops. No murmurs 


ABDOMEN: Soft. Bowel sounds normal. No organomegaly. No abdominal bruits. 


Extremities: reveal no edema.  No clubbing or cyanosis


Neurologically awake, alert, oriented x3 with well-coordinated movements.  No 

focal deficits noted


Skin: No rash or skin lesions. 


Psychiatric: Coperative.  Nonsuicidal


Musculoskeletal: No joint swelling or deformity.  Normal range of motion.








Results


CBC & Chem 7: 


 18 06:27





 18 21:41


Labs: 


 Abnormal Lab Results - Last 24 Hours (Table)











  18 Range/Units





  21:41 21:41 21:41 


 


WBC  3.6 L    (3.8-10.6)  k/uL


 


RBC     (3.80-5.40)  m/uL


 


Hgb  6.4 L*    (11.4-16.0)  gm/dL


 


Hct  25.4 L    (34.0-46.0)  %


 


MCV  65.5 L    (80.0-100.0)  fL


 


MCH  16.5 L    (25.0-35.0)  pg


 


MCHC  25.2 L    (31.0-37.0)  g/dL


 


RDW  17.4 H    (11.5-15.5)  %


 


Neutrophils #     (1.3-7.7)  k/uL


 


Urine Appearance   Cloudy H   (Clear)  


 


Amorphous Sediment   Occasional H   (None)  /hpf


 


Urine Mucus   Rare H   (None)  /hpf


 


Crossmatch    See Detail  














  18 Range/Units





  06:27 


 


WBC  2.9 L  (3.8-10.6)  k/uL


 


RBC  3.72 L  (3.80-5.40)  m/uL


 


Hgb  6.9 L*  (11.4-16.0)  gm/dL


 


Hct  25.3 L  (34.0-46.0)  %


 


MCV  68.1 L  (80.0-100.0)  fL


 


MCH  18.5 L  (25.0-35.0)  pg


 


MCHC  27.1 L  (31.0-37.0)  g/dL


 


RDW  18.3 H  (11.5-15.5)  %


 


Neutrophils #  1.1 L  (1.3-7.7)  k/uL


 


Urine Appearance   (Clear)  


 


Amorphous Sediment   (None)  /hpf


 


Urine Mucus   (None)  /hpf


 


Crossmatch   














Thrombosis Risk Factor Assmnt





- Choose All That Apply


Any of the Below Risk Factors Present?: No





Assessment and Plan


Assessment: 





Abdominal cramps with diarrhea.  Likely acute gastroenteritis.  Improving now


Atypical chest pain.  ACS ruled out


Chronic anemia due to Menorrhagia


Microcytic anemia possible iron deficiency





Plan:


Patient be continued on IV fluids and symptomatic management.  Currently 

patient does not have any chest pain.  Serial EKGs and troponins are negative.  

Patient was recommended to follow with OB/GYN


Patient will be started on iron supplements along with food.


Time with Patient: Greater than 30

## 2018-05-04 NOTE — P.DS
Providers


Date of admission: 


05/04/18 15:37





Expected date of discharge: 05/04/18


Attending physician: 


Annelise Jacinto





Primary care physician: 


Martín Lira





Hospital Course: 





Discharge diagnosis


Abdominal cramps with diarrhea.  Likely acute gastroenteritis.  Improving now


Atypical chest pain.  ACS ruled out


Chronic anemia due to Menorrhagia


Microcytic anemia possible iron deficiency





Hospital course


37-year-old female patient with past medical history significant for anemia 

with heavy periods presents to the emergency department today for evaluation of 

chest discomfort.  Patient states approximately an hour ago she was driving in 

the car when she had sudden onset of severe abdominal cramping that moved up 

into her chest.  Patient states that the abdominal pain has resolved, but she 

continues to have chest discomfort which made her to come to the hospital..  

Patient denies any history of chest pain or similar symptoms. She denies any 

nausea or vomiting with this.  Denies any sweats, shortness of breath, dizziness

, or weakness. 





She denies any recent cough, congestion, fever, or chills. Patient denies any 

recent rash, diarrhea, constipation, hematochezia, melena, back pain, numbness, 

tingling, hematuria, dysuria, urinary urgency, urinary frequency, visual changes

, or any other complaints.


Patient says that she ate hamburger along with symptoms prior to getting her 

abdominal cramps.  Patient developed diarrhea last night which seems to be 

subsided now.





Chest x-ray and KUB x-ray were negative on admission.





Patient recently had laparoscopic hernia repair.  Patient denied any pain at 

the surgical site.





Patient was continued on IV fluids and symptomatic management.  Currently 

patient does not have any chest pain.  Serial EKGs and troponins are negative.  

Patient was recommended to follow with OB/GYN


Patient will be started on iron supplements along with food.  Otherwise patient 

is stable to be discharged home.  No active complaints of chest pain or 

abdominal cramps or diarrhea at this time.





Discharge physical examination was done and vitals reviewed.


Patient Condition at Discharge: Serious





Plan - Discharge Summary


New Discharge Prescriptions: 


New


   Ferrous Sulfate [Feosol] 325 mg PO TID 30 Days #90 tab


Discharge Medication List





Ferrous Sulfate [Feosol] 325 mg PO TID 30 Days #90 tab 05/04/18 [Rx]








Follow up Appointment(s)/Referral(s): 


Pankaj Resendiz MD [Primary Care Provider] - 1-2 days


Michele Ramos MD [STAFF PHYSICIAN] - 1 Week


Discharge Disposition: HOME SELF-CARE

## 2018-06-14 ENCOUNTER — HOSPITAL ENCOUNTER (EMERGENCY)
Dept: HOSPITAL 47 - EC | Age: 38
LOS: 1 days | Discharge: HOME | End: 2018-06-15
Payer: COMMERCIAL

## 2018-06-14 VITALS — RESPIRATION RATE: 16 BRPM | TEMPERATURE: 98.7 F

## 2018-06-14 DIAGNOSIS — Z98.890: ICD-10-CM

## 2018-06-14 DIAGNOSIS — R10.12: ICD-10-CM

## 2018-06-14 DIAGNOSIS — Z88.1: ICD-10-CM

## 2018-06-14 DIAGNOSIS — Z79.899: ICD-10-CM

## 2018-06-14 DIAGNOSIS — N83.202: ICD-10-CM

## 2018-06-14 DIAGNOSIS — D25.9: Primary | ICD-10-CM

## 2018-06-14 DIAGNOSIS — Z98.51: ICD-10-CM

## 2018-06-14 DIAGNOSIS — D64.9: ICD-10-CM

## 2018-06-14 DIAGNOSIS — F17.200: ICD-10-CM

## 2018-06-14 LAB
ALBUMIN SERPL-MCNC: 4.2 G/DL (ref 3.5–5)
ALP SERPL-CCNC: 39 U/L (ref 38–126)
ALT SERPL-CCNC: 19 U/L (ref 9–52)
AMYLASE SERPL-CCNC: 78 U/L (ref 30–110)
ANION GAP SERPL CALC-SCNC: 11 MMOL/L
APTT BLD: 22.8 SEC (ref 22–30)
AST SERPL-CCNC: 17 U/L (ref 14–36)
BASOPHILS # BLD AUTO: 0 K/UL (ref 0–0.2)
BASOPHILS NFR BLD AUTO: 0 %
BUN SERPL-SCNC: 13 MG/DL (ref 7–17)
CALCIUM SPEC-MCNC: 8.9 MG/DL (ref 8.4–10.2)
CHLORIDE SERPL-SCNC: 107 MMOL/L (ref 98–107)
CO2 SERPL-SCNC: 21 MMOL/L (ref 22–30)
EOSINOPHIL # BLD AUTO: 0.1 K/UL (ref 0–0.7)
EOSINOPHIL NFR BLD AUTO: 3 %
ERYTHROCYTE [DISTWIDTH] IN BLOOD BY AUTOMATED COUNT: 4.16 M/UL (ref 3.8–5.4)
ERYTHROCYTE [DISTWIDTH] IN BLOOD: 19.4 % (ref 11.5–15.5)
GLUCOSE SERPL-MCNC: 82 MG/DL (ref 74–99)
HCT VFR BLD AUTO: 26.9 % (ref 34–46)
HGB BLD-MCNC: 7.5 GM/DL (ref 11.4–16)
INR PPP: 1.2 (ref ?–1.2)
LIPASE SERPL-CCNC: 104 U/L (ref 23–300)
LYMPHOCYTES # SPEC AUTO: 2.1 K/UL (ref 1–4.8)
LYMPHOCYTES NFR SPEC AUTO: 42 %
MCH RBC QN AUTO: 18 PG (ref 25–35)
MCHC RBC AUTO-ENTMCNC: 27.8 G/DL (ref 31–37)
MCV RBC AUTO: 64.6 FL (ref 80–100)
MONOCYTES # BLD AUTO: 0.4 K/UL (ref 0–1)
MONOCYTES NFR BLD AUTO: 8 %
NEUTROPHILS # BLD AUTO: 2.3 K/UL (ref 1.3–7.7)
NEUTROPHILS NFR BLD AUTO: 45 %
PH UR: 7 [PH] (ref 5–8)
PLATELET # BLD AUTO: 85 K/UL (ref 150–450)
POTASSIUM SERPL-SCNC: 3.9 MMOL/L (ref 3.5–5.1)
PROT SERPL-MCNC: 6.5 G/DL (ref 6.3–8.2)
PT BLD: 11.5 SEC (ref 9–12)
SODIUM SERPL-SCNC: 139 MMOL/L (ref 137–145)
SP GR UR: 1.01 (ref 1–1.03)
UROBILINOGEN UR QL STRIP: <2 MG/DL (ref ?–2)
WBC # BLD AUTO: 5 K/UL (ref 3.8–10.6)

## 2018-06-14 PROCEDURE — 81003 URINALYSIS AUTO W/O SCOPE: CPT

## 2018-06-14 PROCEDURE — 85610 PROTHROMBIN TIME: CPT

## 2018-06-14 PROCEDURE — 81025 URINE PREGNANCY TEST: CPT

## 2018-06-14 PROCEDURE — 74177 CT ABD & PELVIS W/CONTRAST: CPT

## 2018-06-14 PROCEDURE — 82150 ASSAY OF AMYLASE: CPT

## 2018-06-14 PROCEDURE — 99284 EMERGENCY DEPT VISIT MOD MDM: CPT

## 2018-06-14 PROCEDURE — 85025 COMPLETE CBC W/AUTO DIFF WBC: CPT

## 2018-06-14 PROCEDURE — 83690 ASSAY OF LIPASE: CPT

## 2018-06-14 PROCEDURE — 80053 COMPREHEN METABOLIC PANEL: CPT

## 2018-06-14 PROCEDURE — 36415 COLL VENOUS BLD VENIPUNCTURE: CPT

## 2018-06-14 PROCEDURE — 85730 THROMBOPLASTIN TIME PARTIAL: CPT

## 2018-06-14 PROCEDURE — 96360 HYDRATION IV INFUSION INIT: CPT

## 2018-06-15 VITALS — HEART RATE: 72 BPM | DIASTOLIC BLOOD PRESSURE: 57 MMHG | SYSTOLIC BLOOD PRESSURE: 111 MMHG

## 2018-06-15 NOTE — ED
Pediatric GI HPI





- General


Chief Complaint: Abdominal Pain


Stated Complaint: side pain


Time Seen by Provider: 18 22:33


Source: patient, RN notes reviewed, old records reviewed


Mode of arrival: ambulatory


Limitations: no limitations





- History of Present Illness


Initial Comments: 





This is a 37 year old female with CC of left sided abdominal pain for 2 days. 

She reports it is worse with movement. History of hernia repair 2 months ago, 

and is concerned that something is wrong with her hernia repair. No fevers or 

chills. She denies nausea, vomiting, diarrhea or fevers. Denies urinary 

symptoms. 





- Related Data


 Home Medications











 Medication  Instructions  Recorded  Confirmed


 


Ferrous Sulfate [Feosol] 325 mg PO DAILY 18











 Allergies











Allergy/AdvReac Type Severity Reaction Status Date / Time


 


sulfamethoxazole Allergy  Rash/Hives Verified 18 22:34





[From Bactrim]     


 


trimethoprim [From Bactrim] Allergy  Rash/Hives Verified 18 22:34














Review of Systems


ROS Statement: 


Those systems with pertinent positive or pertinent negative responses have been 

documented in the HPI.





ROS Other: All systems not noted in ROS Statement are negative.





Past Medical History


Past Medical History: No Reported History


Additional Past Medical History / Comment(s): Anemia secondary to menorrhagia


History of Any Multi-Drug Resistant Organisms: None Reported


Past Surgical History:  Section, Hernia Repair, Tubal Ligation


Additional Past Surgical History / Comment(s): ganglion cyst removal-left wrist


Past Anesthesia/Blood Transfusion Reactions: No Reported Reaction


Past Psychological History: No Psychological Hx Reported


Smoking Status: Current every day smoker


Past Alcohol Use History: Occasional


Past Drug Use History: None Reported





- Past Family History


  ** Mother


Family Medical History: Hypertension





  ** Father


Family Medical History: Cancer





General Exam





- General Exam Comments


Initial Comments: 





36 yo female no distress. 


Limitations: no limitations


General appearance: alert, in no apparent distress


Head exam: Present: atraumatic, normocephalic, normal inspection


Eye exam: Present: normal appearance, PERRL, EOMI.  Absent: scleral icterus, 

conjunctival injection, periorbital swelling


ENT exam: Present: normal exam, mucous membranes moist


Neck exam: Present: normal inspection.  Absent: tenderness, meningismus, 

lymphadenopathy


Respiratory exam: Present: normal lung sounds bilaterally.  Absent: respiratory 

distress, wheezes, rales, rhonchi, stridor


Cardiovascular Exam: Present: regular rate, normal rhythm, normal heart sounds.

  Absent: systolic murmur, diastolic murmur, rubs, gallop, clicks


GI/Abdominal exam: Present: soft, tenderness (L UQ tenderness), normal bowel 

sounds.  Absent: distended, guarding, rebound, rigid





Course


 Vital Signs











  06/14/18 06/15/18





  22:16 00:40


 


Temperature 98.7 F 98.7 F


 


Pulse Rate 68 72


 


Respiratory 16 16





Rate  


 


Blood Pressure 110/70 111/57


 


O2 Sat by Pulse 100 100





Oximetry  














Medical Decision Making





- Medical Decision Making





Patient is a 37 year old female, concerned with disruption of her hernia repair 

with Left upper quadrant pain. She had umbilical hernia repair. Incision sites 

appear well. Patient received IV fliuds and labs obtained. Labs are evidence of 

anemia, patient is aware of this. Her hemoglobin is 7.5, no active vaginal 

bleeding. This hemoglobin is an improvement from all previous lab work.  

Patient CT abodmen and pelvis shows evidence of a uterine fibroid. No acute 

changes. Patient informed of these reesutls. Discussed PCP follow up. CT also 

shows left ovarian cyst, disucssed this could be related to patietn pain. Not 

concerned for ovarian torsion due to lack of lower tenderness and patient 

appears in no distress. 





- Lab Data


Result diagrams: 


 18 23:13





 18 23:13


 Lab Results











  18 Range/Units





  23:13 23:13 23:13 


 


WBC   5.0   (3.8-10.6)  k/uL


 


RBC   4.16   (3.80-5.40)  m/uL


 


Hgb   7.5 L   (11.4-16.0)  gm/dL


 


Hct   26.9 L   (34.0-46.0)  %


 


MCV   64.6 L   (80.0-100.0)  fL


 


MCH   18.0 L   (25.0-35.0)  pg


 


MCHC   27.8 L   (31.0-37.0)  g/dL


 


RDW   19.4 H   (11.5-15.5)  %


 


Plt Count   85 L D   (150-450)  k/uL


 


Neutrophils %   45   %


 


Lymphocytes %   42   %


 


Monocytes %   8   %


 


Eosinophils %   3   %


 


Basophils %   0   %


 


Neutrophils #   2.3   (1.3-7.7)  k/uL


 


Lymphocytes #   2.1   (1.0-4.8)  k/uL


 


Monocytes #   0.4   (0-1.0)  k/uL


 


Eosinophils #   0.1   (0-0.7)  k/uL


 


Basophils #   0.0   (0-0.2)  k/uL


 


Hypochromasia   Marked   


 


Poikilocytosis   Slight   


 


Anisocytosis   Slight   


 


Microcytosis   Marked   


 


PT    11.5  (9.0-12.0)  sec


 


INR    1.2 H  (<1.2)  


 


APTT    22.8  (22.0-30.0)  sec


 


Sodium  139    (137-145)  mmol/L


 


Potassium  3.9    (3.5-5.1)  mmol/L


 


Chloride  107    ()  mmol/L


 


Carbon Dioxide  21 L    (22-30)  mmol/L


 


Anion Gap  11    mmol/L


 


BUN  13    (7-17)  mg/dL


 


Creatinine  0.50 L    (0.52-1.04)  mg/dL


 


Est GFR (CKD-EPI)AfAm  >90    (>60 ml/min/1.73 sqM)  


 


Est GFR (CKD-EPI)NonAf  >90    (>60 ml/min/1.73 sqM)  


 


Glucose  82    (74-99)  mg/dL


 


Calcium  8.9    (8.4-10.2)  mg/dL


 


Total Bilirubin  <0.1 L    (0.2-1.3)  mg/dL


 


AST  17    (14-36)  U/L


 


ALT  19    (9-52)  U/L


 


Alkaline Phosphatase  39    ()  U/L


 


Total Protein  6.5    (6.3-8.2)  g/dL


 


Albumin  4.2    (3.5-5.0)  g/dL


 


Amylase  78    ()  U/L


 


Lipase  104    ()  U/L


 


Urine Color     


 


Urine Appearance     (Clear)  


 


Urine pH     (5.0-8.0)  


 


Ur Specific Gravity     (1.001-1.035)  


 


Urine Protein     (Negative)  


 


Urine Glucose (UA)     (Negative)  


 


Urine Ketones     (Negative)  


 


Urine Blood     (Negative)  


 


Urine Nitrite     (Negative)  


 


Urine Bilirubin     (Negative)  


 


Urine Urobilinogen     (<2.0)  mg/dL


 


Ur Leukocyte Esterase     (Negative)  


 


Urine HCG, Qual     (Not Detectd)  














  18 Range/Units





  23:13 23:13 


 


WBC    (3.8-10.6)  k/uL


 


RBC    (3.80-5.40)  m/uL


 


Hgb    (11.4-16.0)  gm/dL


 


Hct    (34.0-46.0)  %


 


MCV    (80.0-100.0)  fL


 


MCH    (25.0-35.0)  pg


 


MCHC    (31.0-37.0)  g/dL


 


RDW    (11.5-15.5)  %


 


Plt Count    (150-450)  k/uL


 


Neutrophils %    %


 


Lymphocytes %    %


 


Monocytes %    %


 


Eosinophils %    %


 


Basophils %    %


 


Neutrophils #    (1.3-7.7)  k/uL


 


Lymphocytes #    (1.0-4.8)  k/uL


 


Monocytes #    (0-1.0)  k/uL


 


Eosinophils #    (0-0.7)  k/uL


 


Basophils #    (0-0.2)  k/uL


 


Hypochromasia    


 


Poikilocytosis    


 


Anisocytosis    


 


Microcytosis    


 


PT    (9.0-12.0)  sec


 


INR    (<1.2)  


 


APTT    (22.0-30.0)  sec


 


Sodium    (137-145)  mmol/L


 


Potassium    (3.5-5.1)  mmol/L


 


Chloride    ()  mmol/L


 


Carbon Dioxide    (22-30)  mmol/L


 


Anion Gap    mmol/L


 


BUN    (7-17)  mg/dL


 


Creatinine    (0.52-1.04)  mg/dL


 


Est GFR (CKD-EPI)AfAm    (>60 ml/min/1.73 sqM)  


 


Est GFR (CKD-EPI)NonAf    (>60 ml/min/1.73 sqM)  


 


Glucose    (74-99)  mg/dL


 


Calcium    (8.4-10.2)  mg/dL


 


Total Bilirubin    (0.2-1.3)  mg/dL


 


AST    (14-36)  U/L


 


ALT    (9-52)  U/L


 


Alkaline Phosphatase    ()  U/L


 


Total Protein    (6.3-8.2)  g/dL


 


Albumin    (3.5-5.0)  g/dL


 


Amylase    ()  U/L


 


Lipase    ()  U/L


 


Urine Color  Light Yellow   


 


Urine Appearance  Clear   (Clear)  


 


Urine pH  7.0   (5.0-8.0)  


 


Ur Specific Gravity  1.008   (1.001-1.035)  


 


Urine Protein  Negative   (Negative)  


 


Urine Glucose (UA)  Negative   (Negative)  


 


Urine Ketones  Negative   (Negative)  


 


Urine Blood  Negative   (Negative)  


 


Urine Nitrite  Negative   (Negative)  


 


Urine Bilirubin  Negative   (Negative)  


 


Urine Urobilinogen  <2.0   (<2.0)  mg/dL


 


Ur Leukocyte Esterase  Negative   (Negative)  


 


Urine HCG, Qual   Not Detected  (Not Detectd)  














- Radiology Data


Radiology results: report reviewed


CT shows no renal stone or obstruction.  Left ovarian cyst.  Uterine fibroid 

noted.





Disposition


Clinical Impression: 


 Anemia, Left lateral abdominal pain, Uterine fibroid





Disposition: HOME SELF-CARE


Condition: Good


Instructions:  Abdominal Pain (ED)


Additional Instructions: 


Patient can take Motrin Tylenol for pain.  Apply warm compresses over area.  

Follow-up with primary care provider.


Is patient prescribed a controlled substance at d/c from ED?: No


When asked, does pt state using other controlled substances?: No


If prescribed controlled substance>3 days was MAPS reviewed?: No


If opioid is for acute pain is fill amount 7 days or less?: No


If Rx opioid, was Start Talking consent form obtained?: No


Referrals: 


Pankaj Resendiz MD [Primary Care Provider] - 1-2 days


Time of Disposition: 00:30

## 2018-06-15 NOTE — CT
EXAMINATION TYPE: CT abdomen pelvis w con

 

DATE OF EXAM: 6/15/2018

 

COMPARISON: NONE

 

HISTORY: LEFT SIDE ABDOMINAL PAIN

 

CT DLP: 385.50 mGycm

Automated exposure control for dose reduction was used.

 

TECHNIQUE:  Helical acquisition of images was performed from the lung bases through the pelvis.

 

CONTRAST: 

Performed without Oral Contrast and with IV Contrast, patient injected with 100 mL of Isovue 300.

 

FINDINGS: 

 

Lung bases are clear. There is no pleural effusion. Liver spleen pancreas gallbladder appear normal. 
Bile ducts are not dilated. There is no adrenal mass. There is no adrenal mass. Kidneys show satisfac
tory contrast opacification. There is no hydronephrosis. I see no intestinal wall thickening. There a
re no dilated loops. Bladder distends smoothly. There is no free fluid in the pelvis. Uterus is antev
erted. The bony structures are intact. There is a 2.5 cm rounded high attenuation area on the posteri
or lower uterine segment that could be a fibroid. There is probably a 2 cm left ovarian cyst. Appendi
x is not seen. There is no sign of appendicitis.: I see no bony destructive process. Lumbar spine jane
ears normal.

 

IMPRESSION: 

NO RENAL STONE OR OBSTRUCTION. LEFT OVARIAN CYST. UTERINE FIBROID.

## 2018-09-18 ENCOUNTER — HOSPITAL ENCOUNTER (EMERGENCY)
Dept: HOSPITAL 47 - EC | Age: 38
Discharge: HOME | End: 2018-09-18
Payer: COMMERCIAL

## 2018-09-18 VITALS
SYSTOLIC BLOOD PRESSURE: 110 MMHG | RESPIRATION RATE: 16 BRPM | HEART RATE: 96 BPM | DIASTOLIC BLOOD PRESSURE: 80 MMHG | TEMPERATURE: 98.2 F

## 2018-09-18 DIAGNOSIS — J40: Primary | ICD-10-CM

## 2018-09-18 DIAGNOSIS — R10.9: ICD-10-CM

## 2018-09-18 DIAGNOSIS — J98.01: ICD-10-CM

## 2018-09-18 DIAGNOSIS — Z88.2: ICD-10-CM

## 2018-09-18 DIAGNOSIS — F17.200: ICD-10-CM

## 2018-09-18 LAB
PH UR: 6.5 [PH] (ref 5–8)
SP GR UR: 1.01 (ref 1–1.03)
UROBILINOGEN UR QL STRIP: <2 MG/DL (ref ?–2)

## 2018-09-18 PROCEDURE — 99284 EMERGENCY DEPT VISIT MOD MDM: CPT

## 2018-09-18 PROCEDURE — 71046 X-RAY EXAM CHEST 2 VIEWS: CPT

## 2018-09-18 PROCEDURE — 81003 URINALYSIS AUTO W/O SCOPE: CPT

## 2018-09-18 PROCEDURE — 94640 AIRWAY INHALATION TREATMENT: CPT

## 2018-09-18 NOTE — XR
EXAMINATION TYPE: XR chest 2V

 

DATE OF EXAM: 9/18/2018

 

COMPARISON: 5/3/2018

 

TECHNIQUE: PA and lateral views submitted.

 

HISTORY: Difficulty breathing

 

FINDINGS:

The lungs are clear and  there is no pneumothorax, pleural effusion, or focal pneumonia. Multiple tin
y calcifications in the upper lobes may been the basis of tiny granuloma. Hypertrophic and degenerati
ve change of the spine. No overt failure. 

 

IMPRESSION: 

1. No acute process.

## 2018-09-18 NOTE — ED
General Adult HPI





- General


Chief complaint: Dizziness


Stated complaint: bronchitis


Time Seen by Provider: 18 13:40


Source: patient, RN notes reviewed


Mode of arrival: ambulatory


Limitations: no limitations





- History of Present Illness


Initial comments: 





This is a 38-year-old female who presents emergency Department complaining of 

being dizzy.  Patient states she is a smoker.  Patient states she was recently 

diagnosed with bronchitis and given Zithromax promethazine and a steroid.  

Patient states the dizziness makes her feel just a little off balance but not 

like she's been a fall.  Patient denies any syncopal or near syncopal episodes.

  Patient denies any headache patient denies any numbness weakness.  Patient 

denies any fever chills.  Patient states the cough is still persistent now she 

is coughing up quite a bit of phlegm.  Patient states she's not really short of 

breath but she is noting that she has some left-sided flank pain she should 

like to have a urine checked because she has had a history of urinary tract 

infections.





- Related Data


 Home Medications











 Medication  Instructions  Recorded  Confirmed


 


Azithromycin [Zithromax Z-pack] See Taper PO AS DIRECTED 18


 


Promethazine 6.25MG/5Ml [Phenergan 6.25 mg PO Q6H PRN 18





Syrup]   








 Previous Rx's











 Medication  Instructions  Recorded


 


Albuterol Inhaler [Ventolin Hfa 1 - 2 puff INHALATION Q6HR PRN #2 18





Inhaler] puff 











 Allergies











Allergy/AdvReac Type Severity Reaction Status Date / Time


 


sulfamethoxazole Allergy  Rash/Hives Verified 18 14:45





[From Bactrim]     


 


trimethoprim [From Bactrim] Allergy  Rash/Hives Verified 18 14:45














Review of Systems


ROS Statement: 


Those systems with pertinent positive or pertinent negative responses have been 

documented in the HPI.





ROS Other: All systems not noted in ROS Statement are negative.





Past Medical History


Past Medical History: No Reported History


Additional Past Medical History / Comment(s): Anemia secondary to menorrhagia


History of Any Multi-Drug Resistant Organisms: None Reported


Past Surgical History:  Section, Hernia Repair, Tubal Ligation


Additional Past Surgical History / Comment(s): ganglion cyst removal-left wrist


Past Anesthesia/Blood Transfusion Reactions: No Reported Reaction


Past Psychological History: No Psychological Hx Reported


Smoking Status: Current every day smoker


Past Alcohol Use History: Occasional


Past Drug Use History: None Reported





- Past Family History


  ** Mother


Family Medical History: Hypertension





  ** Father


Family Medical History: Cancer





General Exam





- General Exam Comments


Initial Comments: 





GENERAL:


Patient is well-developed and well-nourished.  Patient is nontoxic and well-

hydrated and is in mild distress.





ENT:


Neck is soft and supple.  No significant lymphadenopathy is noted.  Oropharynx 

is clear.  Moist mucous membranes.  Neck has full range of motion without 

eliciting any pain. 





EYES:


The sclera were anicteric and conjunctiva were pink and moist.  Extraocular 

movements were intact and pupils were equal round and reactive to light.  

Eyelids were unremarkable.





PULMONARY:


Patient has expiratory wheezing bilaterally and crackles in the left base.





CARDIOVASCULAR:


There is a regular rate and rhythm without any murmurs gallops or rubs.  





ABDOMEN:


Soft and nontender with normal bowel sounds. 





SKIN:


Skin is clear with no lesions or rashes and otherwise unremarkable.





NEUROLOGIC:


Patient is alert and oriented x3.  Cranial nerves II through XII are grossly 

intact.  Motor and sensory are also intact.  Normal speech, volume and content.

  Symmetrical smile.  





MUSCULOSKELETAL:


Normal extremities with adequate strength and full range of motion.  No lower 

extremity swelling or edema.  No calf tenderness.





LYMPHATICS:


No significant lymphadenopathy is noted





PSYCHIATRIC:


Normal psychiatric evaluation. 


Limitations: no limitations





Course


 Vital Signs











  18





  13:38 14:12 14:20


 


Temperature 98.3 F  


 


Pulse Rate 90 90 


 


Pulse Rate [   





Sitting]   


 


Pulse Rate [   





Standing]   


 


Pulse Rate [   81





Supine]   


 


Respiratory 18  18





Rate   


 


Blood Pressure 102/69  


 


Blood Pressure   





[Sitting]   


 


Blood Pressure   





[Standing]   


 


Blood Pressure   105/55





[Supine]   


 


O2 Sat by Pulse 100  100





Oximetry   














  18





  14:22 14:24


 


Temperature  


 


Pulse Rate 90 


 


Pulse Rate [ 94 





Sitting]  


 


Pulse Rate [  98





Standing]  


 


Pulse Rate [  





Supine]  


 


Respiratory  





Rate  


 


Blood Pressure  


 


Blood Pressure 107/61 





[Sitting]  


 


Blood Pressure  109/68





[Standing]  


 


Blood Pressure  





[Supine]  


 


O2 Sat by Pulse  





Oximetry  














Medical Decision Making





- Medical Decision Making





Chest x-ray shows no acute abnormality.





Urinalysis was normal.





Patient received a breathing treatment and on eliciting her she was completely 

free of wheezing.  Patient had no episodes of dizziness while in the emergency 

department.





- Lab Data


 Lab Results











  18 Range/Units





  14:30 


 


Urine Color  Yellow  


 


Urine Appearance  Clear  (Clear)  


 


Urine pH  6.5  (5.0-8.0)  


 


Ur Specific Gravity  1.011  (1.001-1.035)  


 


Urine Protein  Negative  (Negative)  


 


Urine Glucose (UA)  Negative  (Negative)  


 


Urine Ketones  Negative  (Negative)  


 


Urine Blood  Negative  (Negative)  


 


Urine Nitrite  Negative  (Negative)  


 


Urine Bilirubin  Negative  (Negative)  


 


Urine Urobilinogen  <2.0  (<2.0)  mg/dL


 


Ur Leukocyte Esterase  Negative  (Negative)  














Disposition


Clinical Impression: 


 Bronchitis with bronchospasm





Disposition: HOME SELF-CARE


Condition: Good


Prescriptions: 


Albuterol Inhaler [Ventolin Hfa Inhaler] 1 - 2 puff INHALATION Q6HR PRN #2 puff


 PRN Reason: Difficulty breathing  


Is patient prescribed a controlled substance at d/c from ED?: No


Referrals: 


Pankaj Resendiz MD [Primary Care Provider] - 1-2 days


Time of Disposition: 15:56

## 2018-11-06 ENCOUNTER — HOSPITAL ENCOUNTER (EMERGENCY)
Dept: HOSPITAL 47 - EC | Age: 38
Discharge: HOME | End: 2018-11-06
Payer: COMMERCIAL

## 2018-11-06 VITALS — TEMPERATURE: 99.2 F | DIASTOLIC BLOOD PRESSURE: 63 MMHG | SYSTOLIC BLOOD PRESSURE: 107 MMHG | HEART RATE: 84 BPM

## 2018-11-06 VITALS — RESPIRATION RATE: 18 BRPM

## 2018-11-06 DIAGNOSIS — F17.200: ICD-10-CM

## 2018-11-06 DIAGNOSIS — J06.9: Primary | ICD-10-CM

## 2018-11-06 DIAGNOSIS — D64.9: ICD-10-CM

## 2018-11-06 DIAGNOSIS — Z88.2: ICD-10-CM

## 2018-11-06 LAB
ALBUMIN SERPL-MCNC: 4.5 G/DL (ref 3.5–5)
ALP SERPL-CCNC: 48 U/L (ref 38–126)
ALT SERPL-CCNC: 17 U/L (ref 9–52)
ANION GAP SERPL CALC-SCNC: 12 MMOL/L
AST SERPL-CCNC: 17 U/L (ref 14–36)
BASOPHILS # BLD AUTO: 0 K/UL (ref 0–0.2)
BASOPHILS NFR BLD AUTO: 1 %
BUN SERPL-SCNC: 8 MG/DL (ref 7–17)
CALCIUM SPEC-MCNC: 9.5 MG/DL (ref 8.4–10.2)
CHLORIDE SERPL-SCNC: 106 MMOL/L (ref 98–107)
CO2 SERPL-SCNC: 22 MMOL/L (ref 22–30)
EOSINOPHIL # BLD AUTO: 0.1 K/UL (ref 0–0.7)
EOSINOPHIL NFR BLD AUTO: 1 %
ERYTHROCYTE [DISTWIDTH] IN BLOOD BY AUTOMATED COUNT: 3.87 M/UL (ref 3.8–5.4)
ERYTHROCYTE [DISTWIDTH] IN BLOOD: 21.5 % (ref 11.5–15.5)
GLUCOSE SERPL-MCNC: 106 MG/DL (ref 74–99)
HCT VFR BLD AUTO: 25 % (ref 34–46)
HGB BLD-MCNC: 6.8 GM/DL (ref 11.4–16)
LYMPHOCYTES # SPEC AUTO: 1.5 K/UL (ref 1–4.8)
LYMPHOCYTES NFR SPEC AUTO: 20 %
MCH RBC QN AUTO: 17.6 PG (ref 25–35)
MCHC RBC AUTO-ENTMCNC: 27.3 G/DL (ref 31–37)
MCV RBC AUTO: 64.4 FL (ref 80–100)
MONOCYTES # BLD AUTO: 0.4 K/UL (ref 0–1)
MONOCYTES NFR BLD AUTO: 5 %
NEUTROPHILS # BLD AUTO: 5.4 K/UL (ref 1.3–7.7)
NEUTROPHILS NFR BLD AUTO: 71 %
PLATELET # BLD AUTO: 291 K/UL (ref 150–450)
POTASSIUM SERPL-SCNC: 3.7 MMOL/L (ref 3.5–5.1)
PROT SERPL-MCNC: 7.4 G/DL (ref 6.3–8.2)
SODIUM SERPL-SCNC: 140 MMOL/L (ref 137–145)
WBC # BLD AUTO: 7.5 K/UL (ref 3.8–10.6)

## 2018-11-06 PROCEDURE — 86901 BLOOD TYPING SEROLOGIC RH(D): CPT

## 2018-11-06 PROCEDURE — 99283 EMERGENCY DEPT VISIT LOW MDM: CPT

## 2018-11-06 PROCEDURE — 36415 COLL VENOUS BLD VENIPUNCTURE: CPT

## 2018-11-06 PROCEDURE — 71046 X-RAY EXAM CHEST 2 VIEWS: CPT

## 2018-11-06 PROCEDURE — 86920 COMPATIBILITY TEST SPIN: CPT

## 2018-11-06 PROCEDURE — 85025 COMPLETE CBC W/AUTO DIFF WBC: CPT

## 2018-11-06 PROCEDURE — 80053 COMPREHEN METABOLIC PANEL: CPT

## 2018-11-06 PROCEDURE — 86850 RBC ANTIBODY SCREEN: CPT

## 2018-11-06 PROCEDURE — 86900 BLOOD TYPING SEROLOGIC ABO: CPT

## 2018-11-06 NOTE — XR
EXAMINATION: XR chest 2V

 

DATE AND TIME:  11/6/2018 5:31 PM

 

CLINICAL INDICATION: Pain

 

TECHNIQUE: PA and lateral

 

COMPARISON: September 18, 2018 

 

FINDINGS: 

The lungs are clear. 

 

The pleural spaces are negative.

 

The cardiac silhouette is not enlarged. 

 

The remainder of the mediastinal silhouette is unremarkable. 

 

The skeletal structures and soft tissues are negative for acute findings.

 

IMPRESSION:

NO ACUTE PROCESS.

## 2018-11-06 NOTE — ED
General Adult HPI





- General


Source: patient


Mode of arrival: ambulatory


Limitations: no limitations





<Piero Lang - Last Filed: 18 20:27>





<Veronica Medrano - Last Filed: 18 04:06>





- General


Chief complaint: Upper Respiratory Infection


Stated complaint: Fever, Cough





- History of Present Illness


Initial comments: 


38-year-old female patient with past medical history of anemia presents to ED 

for 2 day history of URI symptoms. Patient states that for the last 2 days, she 

has had malaise, nonproductive cough, sneeze, rhinitis, sore throat.  Patient 

denies weakness, heart palpitations, fevers or chills. Patient also denies 

nausea/vomiting, diarrhea, abdominal pain, dysuria,  vaginal discharge, chest 

pain, sob.  Patient is currently on her menses, states that her menstual period 

is at her baseline, denies hemorrhage.  Patient has a history of anemia 

secondary to heavy menses, last transfusion was approximately in .  Patient 

denies other complaints.





Systemic: Pt denies fatigue, myalgia, fever/chills, rash. Pt denies weakness, 

night sweats, weight loss. 


Neuro: Pt denies headache, visual disturbances, syncope or pre-syncope.


HEENT: Pt denies ocular discharge or irritation, otalgia, rhinorrhea, 

pharyngitis or notable lymphadenopathy. 


Cardiopulmonary: Pt denies chest pain, SOB, heart palpitations, dyspnea on 

exertion.  


Abdominal/GI: Pt denies abdominal pain, n/v/d. 


: Pt denies dysuria, burning w/ urination, frequency/urgency. Denies new 

onset urinary or bowel incontinence.  


MSK: Pt denies myalgia, loss of strength or function in extremities. 


 


 (Piero Lang)





- Related Data


 Home Medications











 Medication  Instructions  Recorded  Confirmed


 


Azithromycin [Zithromax Z-pack] See Taper PO AS DIRECTED 18


 


Promethazine 6.25MG/5Ml [Phenergan 6.25 mg PO Q6H PRN 18





Syrup]   








 Previous Rx's











 Medication  Instructions  Recorded


 


Albuterol Inhaler [Ventolin Hfa 1 - 2 puff INHALATION Q6HR PRN #2 18





Inhaler] puff 


 


Azithromycin [Zithromax Z-pack] 250 mg PO AS DIRECTED #6 tab 18


 


Promethazine/Dextromethorphan 5 ml PO TID #120 ml 18





[Phenergan DM Syrup]  


 


methylPREDNISolone Dose Pack 4 mg PO AS DIRECTED #21 package 18





[Medrol Dose Pack]  











 Allergies











Allergy/AdvReac Type Severity Reaction Status Date / Time


 


sulfamethoxazole Allergy  Rash/Hives Verified 18 16:28





[From Bactrim]     


 


trimethoprim [From Bactrim] Allergy  Rash/Hives Verified 18 16:28














Review of Systems


ROS Other: All systems not noted in ROS Statement are negative.





<Piero Lang - Last Filed: 18 20:27>


ROS Other: All systems not noted in ROS Statement are negative.





<Veronica Medrano - Last Filed: 18 04:06>


ROS Statement: 


Those systems with pertinent positive or pertinent negative responses have been 

documented in the HPI.








Past Medical History


Past Medical History: No Reported History


Additional Past Medical History / Comment(s): Anemia secondary to menorrhagia


History of Any Multi-Drug Resistant Organisms: None Reported


Past Surgical History:  Section, Hernia Repair, Tubal Ligation


Additional Past Surgical History / Comment(s): ganglion cyst removal-left wrist


Past Anesthesia/Blood Transfusion Reactions: No Reported Reaction


Past Psychological History: No Psychological Hx Reported


Smoking Status: Current every day smoker


Past Alcohol Use History: Occasional


Past Drug Use History: None Reported





- Past Family History


  ** Mother


Family Medical History: Hypertension





  ** Father


Family Medical History: Cancer





<Piero Lang - Last Filed: 18 20:27>





General Exam


Limitations: no limitations





<Piero Lang - Last Filed: 18 20:27>





<Veronica Medrano - Last Filed: 18 04:06>





- General Exam Comments


Initial Comments: 








Constitutional: NAD, AOX3, Pt has pleasant affect. 


HEENT: NC/AT, trachea midline, neck supple, no lymphadenopathy. Posterior 

pharynx non erythematous, without exudates. External ears appear normal, 

without discharge. Mucous membranes moist. Eyes PERRLA, EOM intact. There is no 

scleral icterus. No pallor noted. 


Cardiopulmonary: RRR, no murmurs, rubs or gallops, no JVD noted. Lungs CTAB in 

anterior and posterior fields. No peripheral edema.  Cough noted, nonproductive.


Abdominal exam: Abdomen soft and non-distended. Abdomen non-tender to palpation 

in all 4 quadrants. Bowel sounds active in LLQ. No hepatosplenomegaly.  


Neuro: CN II-XII grossly intact. 





 (Piero Lang)


 Vital Signs











  18





  16:26 18:48 20:18


 


Temperature 99.1 F 98.0 F 98.7 F


 


Pulse Rate 106 H 96 90


 


Respiratory 20 18 18





Rate   


 


Blood Pressure 105/73 107/56 110/56


 


O2 Sat by Pulse 100 98 98





Oximetry   














  18





  20:20 20:30 21:00


 


Temperature 98.5 F 98.6 F 99.5 F


 


Pulse Rate 88 90 89


 


Respiratory 18 18 18





Rate   


 


Blood Pressure 110/67 113/63 109/65


 


O2 Sat by Pulse 100 99 100





Oximetry   














  18





  21:31 22:29 23:40


 


Temperature 99.3 F 100.2 F H 99.2 F


 


Pulse Rate  89 84


 


Respiratory  18 18





Rate   


 


Blood Pressure  107/58 107/63


 


O2 Sat by Pulse  100 97





Oximetry   














Medical Decision Making





- Lab Data


Result diagrams: 


 18 17:21





 18 17:21





<Piero Lang - Last Filed: 18 20:27>





- Lab Data


Result diagrams: 


 18 17:21





 18 17:21





<Veronica Medrano - Last Filed: 18 04:06>





- Medical Decision Making


38-year-old female patient presented to ED with complaints of cough, sore throat

, sneezing for 2 days.  Physical exam of patient was benign, lungs clear, 

posterior pharynx non-erythematous.  Laboratory studies including a CBC and a 

CMP were conducted.  The CBC displayed an anemia, hemoglobin of 6.8.  Review of 

patient's laboratory records reveal that she has a history of anemia, baseline 

around 7.07.5.  A chest x-ray was also conducted, did not display any acute 

process, consolidation.  Patient was administered one unit of blood for anemia. 

explained to patient that the cause of her upper respiratory infection is 

likely viral and self-limiting.   Case discussed with Dr. Benitez.  Patient to 

return to ED if new signs or symptoms develop including weakness, chest pain, 

shortness of breath, abdominal pain, cough, notable lymphadenopathy.  Patient 

to follow up with her PCP and her OB/GYN, Dr. Perez in 1-2 days. 





 (Piero Lang)





I received this Patient as a sign out from Jimenez Bailey at 8:00 PM.  Patient was 

seen for upper respiratory infection.  She was also found to be severely anemic 

with a hemoglobin of 6.8.  His due to heavy menstrual cycles.  She had received 

a blood transfusion a few months ago.  Blood transfusion was initiated.  I was 

to follow patient's care during blood transfusion.  She was here for upper 

respiratory congestion, sore throat cough.  Lungs sounds were clear.  WC was 

within normal limits.  Chest x-ray was negative for any acute process.  While 

receiving her blood transfusion Patient did have an increased temperature up to 

100.2.  Patient's low-grade fever is likely due to patient's initial symptoms 

of the upper respiratory infection.  She denied any shortness of breath, 

itching or other concerns for transfusion reaction.  She was kept emergency 

department and was given Motrin Tylenol.  Patient was monitored for 

approximately one hour after this.  She continued to have no significant 

shortness of breath, and denied any rashes or itching.  I do not believe that 

the rising her temperature again was related to transfusion reaction.  We 

discussed the case with Dr. Benitez.  He agreed.  We'll discharge the Patient on a 

short course of steroids, and cough syrup for upper respiratory infection.  

Discussed strict return parameters. (Veronica Medrano)





- Lab Data


 Lab Results











  18 Range/Units





  17:21 17:21 18:13 


 


WBC  7.5    (3.8-10.6)  k/uL


 


RBC  3.87    (3.80-5.40)  m/uL


 


Hgb  6.8 L*    (11.4-16.0)  gm/dL


 


Hct  25.0 L    (34.0-46.0)  %


 


MCV  64.4 L    (80.0-100.0)  fL


 


MCH  17.6 L    (25.0-35.0)  pg


 


MCHC  27.3 L    (31.0-37.0)  g/dL


 


RDW  21.5 H    (11.5-15.5)  %


 


Plt Count  291    (150-450)  k/uL


 


Neutrophils %  71    %


 


Lymphocytes %  20    %


 


Monocytes %  5    %


 


Eosinophils %  1    %


 


Basophils %  1    %


 


Neutrophils #  5.4    (1.3-7.7)  k/uL


 


Lymphocytes #  1.5    (1.0-4.8)  k/uL


 


Monocytes #  0.4    (0-1.0)  k/uL


 


Eosinophils #  0.1    (0-0.7)  k/uL


 


Basophils #  0.0    (0-0.2)  k/uL


 


Hypochromasia  Marked    


 


Poikilocytosis  Slight    


 


Anisocytosis  Moderate    


 


Microcytosis  Marked    


 


Sodium   140   (137-145)  mmol/L


 


Potassium   3.7   (3.5-5.1)  mmol/L


 


Chloride   106   ()  mmol/L


 


Carbon Dioxide   22   (22-30)  mmol/L


 


Anion Gap   12   mmol/L


 


BUN   8   (7-17)  mg/dL


 


Creatinine   0.58   (0.52-1.04)  mg/dL


 


Est GFR (CKD-EPI)AfAm   >90   (>60 ml/min/1.73 sqM)  


 


Est GFR (CKD-EPI)NonAf   >90   (>60 ml/min/1.73 sqM)  


 


Glucose   106 H   (74-99)  mg/dL


 


Calcium   9.5   (8.4-10.2)  mg/dL


 


Total Bilirubin   0.3   (0.2-1.3)  mg/dL


 


AST   17   (14-36)  U/L


 


ALT   17   (9-52)  U/L


 


Alkaline Phosphatase   48   ()  U/L


 


Total Protein   7.4   (6.3-8.2)  g/dL


 


Albumin   4.5   (3.5-5.0)  g/dL


 


Blood Type    AB Positive  


 


Blood Type Recheck    No  


 


Antibody Screen    NEGATIVE  


 


Crossmatch    See Detail  


 


Spec Expiration Date    2018 - 2313  














Disposition





<Piero Lang - Last Filed: 18 20:27>


Is patient prescribed a controlled substance at d/c from ED?: No


Time of Disposition: 22:35





<Veronica Medrano - Last Filed: 18 04:06>


Clinical Impression: 


 Anemia, Upper respiratory infection





Disposition: HOME SELF-CARE


Condition: Good


Instructions:  Upper Respiratory Infection (ED)


Additional Instructions: 


Follow Up with primary care physician in regards to significant anemia.  

Patient should return to the emergency department if any alarming signs or 

symptoms occur.  Take the medications as prescribed.


Prescriptions: 


Azithromycin [Zithromax Z-pack] 250 mg PO AS DIRECTED #6 tab


methylPREDNISolone Dose Pack [Medrol Dose Pack] 4 mg PO AS DIRECTED #21 package


Promethazine/Dextromethorphan [Phenergan DM Syrup] 5 ml PO TID #120 ml


Referrals: 


Pankaj Resendiz MD [Primary Care Provider] - 1-2 days


Theresa Perez DO [Doctor of Osteopathic Medicine] - 1-2 days

## 2019-02-02 ENCOUNTER — HOSPITAL ENCOUNTER (EMERGENCY)
Dept: HOSPITAL 47 - EC | Age: 39
Discharge: HOME | End: 2019-02-02
Payer: COMMERCIAL

## 2019-02-02 VITALS
TEMPERATURE: 97.6 F | DIASTOLIC BLOOD PRESSURE: 47 MMHG | RESPIRATION RATE: 18 BRPM | SYSTOLIC BLOOD PRESSURE: 104 MMHG | HEART RATE: 75 BPM

## 2019-02-02 DIAGNOSIS — R06.02: ICD-10-CM

## 2019-02-02 DIAGNOSIS — Z88.2: ICD-10-CM

## 2019-02-02 DIAGNOSIS — R06.00: ICD-10-CM

## 2019-02-02 DIAGNOSIS — F17.200: ICD-10-CM

## 2019-02-02 DIAGNOSIS — D64.9: ICD-10-CM

## 2019-02-02 DIAGNOSIS — R07.1: Primary | ICD-10-CM

## 2019-02-02 DIAGNOSIS — Z88.1: ICD-10-CM

## 2019-02-02 LAB
ALBUMIN SERPL-MCNC: 4.1 G/DL (ref 3.5–5)
ALP SERPL-CCNC: 41 U/L (ref 38–126)
ALT SERPL-CCNC: 17 U/L (ref 9–52)
ANION GAP SERPL CALC-SCNC: 8 MMOL/L
APTT BLD: 22.1 SEC (ref 22–30)
AST SERPL-CCNC: 19 U/L (ref 14–36)
BASOPHILS # BLD AUTO: 0 K/UL (ref 0–0.2)
BASOPHILS NFR BLD AUTO: 0 %
BUN SERPL-SCNC: 9 MG/DL (ref 7–17)
CALCIUM SPEC-MCNC: 9.4 MG/DL (ref 8.4–10.2)
CHLORIDE SERPL-SCNC: 107 MMOL/L (ref 98–107)
CK SERPL-CCNC: 53 U/L (ref 30–135)
CO2 SERPL-SCNC: 23 MMOL/L (ref 22–30)
D DIMER PPP FEU-MCNC: 0.21 MG/L FEU (ref ?–0.6)
EOSINOPHIL # BLD AUTO: 0.1 K/UL (ref 0–0.7)
EOSINOPHIL NFR BLD AUTO: 1 %
ERYTHROCYTE [DISTWIDTH] IN BLOOD BY AUTOMATED COUNT: 4.3 M/UL (ref 3.8–5.4)
ERYTHROCYTE [DISTWIDTH] IN BLOOD: 19.9 % (ref 11.5–15.5)
GLUCOSE SERPL-MCNC: 76 MG/DL (ref 74–99)
HCT VFR BLD AUTO: 30.1 % (ref 34–46)
HGB BLD-MCNC: 7.9 GM/DL (ref 11.4–16)
INR PPP: 1.1 (ref ?–1.2)
LYMPHOCYTES # SPEC AUTO: 1 K/UL (ref 1–4.8)
LYMPHOCYTES NFR SPEC AUTO: 24 %
MAGNESIUM SPEC-SCNC: 1.8 MG/DL (ref 1.6–2.3)
MCH RBC QN AUTO: 18.3 PG (ref 25–35)
MCHC RBC AUTO-ENTMCNC: 26.2 G/DL (ref 31–37)
MCV RBC AUTO: 69.9 FL (ref 80–100)
MONOCYTES # BLD AUTO: 0.2 K/UL (ref 0–1)
MONOCYTES NFR BLD AUTO: 5 %
NEUTROPHILS # BLD AUTO: 3 K/UL (ref 1.3–7.7)
NEUTROPHILS NFR BLD AUTO: 68 %
PLATELET # BLD AUTO: 379 K/UL (ref 150–450)
POTASSIUM SERPL-SCNC: 4.4 MMOL/L (ref 3.5–5.1)
PROT SERPL-MCNC: 6.8 G/DL (ref 6.3–8.2)
PT BLD: 11.6 SEC (ref 9–12)
SODIUM SERPL-SCNC: 138 MMOL/L (ref 137–145)
TROPONIN I SERPL-MCNC: <0.012 NG/ML (ref 0–0.03)
WBC # BLD AUTO: 4.4 K/UL (ref 3.8–10.6)

## 2019-02-02 PROCEDURE — 71046 X-RAY EXAM CHEST 2 VIEWS: CPT

## 2019-02-02 PROCEDURE — 85025 COMPLETE CBC W/AUTO DIFF WBC: CPT

## 2019-02-02 PROCEDURE — 36415 COLL VENOUS BLD VENIPUNCTURE: CPT

## 2019-02-02 PROCEDURE — 84484 ASSAY OF TROPONIN QUANT: CPT

## 2019-02-02 PROCEDURE — 85730 THROMBOPLASTIN TIME PARTIAL: CPT

## 2019-02-02 PROCEDURE — 99285 EMERGENCY DEPT VISIT HI MDM: CPT

## 2019-02-02 PROCEDURE — 85610 PROTHROMBIN TIME: CPT

## 2019-02-02 PROCEDURE — 82553 CREATINE MB FRACTION: CPT

## 2019-02-02 PROCEDURE — 80053 COMPREHEN METABOLIC PANEL: CPT

## 2019-02-02 PROCEDURE — 83880 ASSAY OF NATRIURETIC PEPTIDE: CPT

## 2019-02-02 PROCEDURE — 83735 ASSAY OF MAGNESIUM: CPT

## 2019-02-02 PROCEDURE — 85379 FIBRIN DEGRADATION QUANT: CPT

## 2019-02-02 PROCEDURE — 82550 ASSAY OF CK (CPK): CPT

## 2019-02-02 NOTE — ED
Chest Pain HPI





- General


Chief Complaint: Chest Pain


Stated Complaint: JENNY


Time Seen by Provider: 19 12:06


Source: patient, family, RN notes reviewed


Mode of arrival: ambulatory


Limitations: no limitations





- History of Present Illness


Initial Comments: 





This is a 37-year-old female who presents with complaints of midsternal and 

lower sternal crampy type pain 9/10 severity.  It occurred after she was 

shaking out a plastic bag.  Very severe with shortness of breath though she 

states she's can still breathe.  This has happened 1 time before she has no 

known history of heart or lung disease.  He is no recent fevers chills nausea 

vomiting sweats or other history of trauma.  Her brother is no known family 

history of any known heart disease.


MD Complaint: chest pain





- Related Data


 Home Medications











 Medication  Instructions  Recorded  Confirmed


 


Ferrous Sulfate [Feosol] 325 mg PO DAILY 19











 Allergies











Allergy/AdvReac Type Severity Reaction Status Date / Time


 


sulfamethoxazole Allergy  Rash/Hives Verified 19 12:15





[From Bactrim]     


 


trimethoprim [From Bactrim] Allergy  Rash/Hives Verified 19 12:15














Review of Systems


ROS Statement: 


Those systems with pertinent positive or pertinent negative responses have been 

documented in the HPI.





ROS Other: All systems not noted in ROS Statement are negative.





EKG Findings





- EKG Results:


EKG: interpreted by CHRIS MORAN, sinus rhythm, normal axis, normal QRS, normal ST/

T, no acute changes (EKG shows a sinus rhythm a 63.  We'll 174 QRS 90 QT since 

/431 st-t wave changes)





Past Medical History


Past Medical History: No Reported History


Additional Past Medical History / Comment(s): Anemia secondary to menorrhagia


History of Any Multi-Drug Resistant Organisms: None Reported


Past Surgical History:  Section, Hernia Repair, Tubal Ligation


Additional Past Surgical History / Comment(s): ganglion cyst removal-left wrist


Past Anesthesia/Blood Transfusion Reactions: No Reported Reaction


Past Psychological History: No Psychological Hx Reported


Smoking Status: Current every day smoker


Past Alcohol Use History: Occasional


Past Drug Use History: None Reported





- Past Family History


  ** Mother


Family Medical History: Hypertension





  ** Father


Family Medical History: Cancer





General Exam





- General Exam Comments


Initial Comments: 





This is a well-developed well-nourished awake alert oriented 3 female


Limitations: no limitations


General appearance: alert, anxious, in distress


Head exam: Present: atraumatic, normocephalic, normal inspection


Eye exam: Present: normal appearance, PERRL, EOMI.  Absent: scleral icterus, 

conjunctival injection, periorbital swelling


ENT exam: Present: normal exam, mucous membranes moist


Neck exam: Present: normal inspection.  Absent: tenderness, meningismus, 

lymphadenopathy


Respiratory exam: Present: normal lung sounds bilaterally, chest wall 

tenderness (Minimal discomfort to palpation over the anterior chest wall no step

-off or crepitation).  Absent: respiratory distress, wheezes, rales, rhonchi, 

stridor


Cardiovascular Exam: Present: regular rate, normal rhythm, normal heart sounds.

  Absent: systolic murmur, diastolic murmur, rubs, gallop, clicks


GI/Abdominal exam: Present: soft, normal bowel sounds.  Absent: distended, 

tenderness, guarding, rebound, rigid


Extremities exam: Present: normal inspection, full ROM, normal capillary 

refill.  Absent: tenderness, pedal edema, joint swelling, calf tenderness


Back exam: Present: normal inspection


Neurological exam: Present: alert, oriented X3, CN II-XII intact


Psychiatric exam: Present: normal affect, normal mood


Skin exam: Present: warm, dry, intact, normal color.  Absent: rash





Course


 Vital Signs











  19





  11:59 13:30 14:00


 


Temperature 98.1 F  


 


Pulse Rate 80 77 68


 


Respiratory 18 18 18





Rate   


 


Blood Pressure 110/74 123/92 123/101


 


O2 Sat by Pulse 100 100 100





Oximetry   














  19





  14:30 15:00


 


Temperature  


 


Pulse Rate 80 77


 


Respiratory 16 17





Rate  


 


Blood Pressure 102/46 101/68


 


O2 Sat by Pulse 100 98





Oximetry  














- Reevaluation(s)


Reevaluation #1: 





19 14:51


Patient is pain-free at this time labs are pending





Chest Pain MDM





- MDM





I did review the chest x-ray shows no acute findings is within normal limits.  

I did discuss the remainder the findings with the patient family members 

patient will be discharged she does have chronic anemia the current hemoglobin 

appears be consistent with prior draws.  Patient additionally has no further 

chest pain.





Disposition


Clinical Impression: 


 Chest wall syndrome





Disposition: HOME SELF-CARE


Condition: Good


Instructions (If sedation given, give patient instructions):  Chest Wall Pain (

ED)


Is patient prescribed a controlled substance at d/c from ED?: No


Referrals: 


Pankaj Resendiz MD [Primary Care Provider] - 1-2 days

## 2019-02-02 NOTE — XR
EXAMINATION TYPE: XR chest 2V

 

DATE OF EXAM: 2/2/2019

 

COMPARISON: Chest x-ray November 6, 2018.

 

HISTORY: Chest pain.

 

TECHNIQUE:  Frontal and lateral views of the chest are obtained.

 

FINDINGS:  There is no focal air space opacity, pleural effusion, or pneumothorax seen.  The cardiac 
silhouette size is within normal limits.   The osseous structures are intact.

 

IMPRESSION:  No acute cardiopulmonary process. No significant change from prior.

## 2019-10-06 ENCOUNTER — HOSPITAL ENCOUNTER (EMERGENCY)
Dept: HOSPITAL 47 - EC | Age: 39
Discharge: HOME | End: 2019-10-06
Payer: COMMERCIAL

## 2019-10-06 VITALS — RESPIRATION RATE: 16 BRPM | HEART RATE: 74 BPM | DIASTOLIC BLOOD PRESSURE: 61 MMHG | SYSTOLIC BLOOD PRESSURE: 103 MMHG

## 2019-10-06 VITALS — TEMPERATURE: 97.7 F

## 2019-10-06 DIAGNOSIS — Z88.1: ICD-10-CM

## 2019-10-06 DIAGNOSIS — D64.9: ICD-10-CM

## 2019-10-06 DIAGNOSIS — Z88.2: ICD-10-CM

## 2019-10-06 DIAGNOSIS — F17.200: ICD-10-CM

## 2019-10-06 DIAGNOSIS — N39.0: Primary | ICD-10-CM

## 2019-10-06 LAB
ALBUMIN SERPL-MCNC: 4.3 G/DL (ref 3.5–5)
ALP SERPL-CCNC: 53 U/L (ref 38–126)
ALT SERPL-CCNC: 13 U/L (ref 9–52)
AMYLASE SERPL-CCNC: 59 U/L (ref 30–110)
ANION GAP SERPL CALC-SCNC: 11 MMOL/L
AST SERPL-CCNC: 22 U/L (ref 14–36)
BASOPHILS # BLD AUTO: 0.1 K/UL (ref 0–0.2)
BASOPHILS NFR BLD AUTO: 1 %
BUN SERPL-SCNC: 15 MG/DL (ref 7–17)
CALCIUM SPEC-MCNC: 9.1 MG/DL (ref 8.4–10.2)
CHLORIDE SERPL-SCNC: 103 MMOL/L (ref 98–107)
CO2 SERPL-SCNC: 23 MMOL/L (ref 22–30)
EOSINOPHIL # BLD AUTO: 0.1 K/UL (ref 0–0.7)
EOSINOPHIL NFR BLD AUTO: 2 %
ERYTHROCYTE [DISTWIDTH] IN BLOOD BY AUTOMATED COUNT: 4.02 M/UL (ref 3.8–5.4)
ERYTHROCYTE [DISTWIDTH] IN BLOOD: 23.6 % (ref 11.5–15.5)
GLUCOSE SERPL-MCNC: 86 MG/DL (ref 74–99)
HCT VFR BLD AUTO: 27.7 % (ref 34–46)
HGB BLD-MCNC: 7.5 GM/DL (ref 11.4–16)
LYMPHOCYTES # SPEC AUTO: 2 K/UL (ref 1–4.8)
LYMPHOCYTES NFR SPEC AUTO: 32 %
MCH RBC QN AUTO: 18.8 PG (ref 25–35)
MCHC RBC AUTO-ENTMCNC: 27.2 G/DL (ref 31–37)
MCV RBC AUTO: 69.1 FL (ref 80–100)
MONOCYTES # BLD AUTO: 0.4 K/UL (ref 0–1)
MONOCYTES NFR BLD AUTO: 6 %
NEUTROPHILS # BLD AUTO: 3.5 K/UL (ref 1.3–7.7)
NEUTROPHILS NFR BLD AUTO: 56 %
PH UR: 6 [PH] (ref 5–8)
PLATELET # BLD AUTO: 245 K/UL (ref 150–450)
POTASSIUM SERPL-SCNC: 4 MMOL/L (ref 3.5–5.1)
PROT SERPL-MCNC: 7.2 G/DL (ref 6.3–8.2)
RBC UR QL: 57 /HPF (ref 0–5)
SODIUM SERPL-SCNC: 137 MMOL/L (ref 137–145)
SP GR UR: 1 (ref 1–1.03)
UROBILINOGEN UR QL STRIP: <2 MG/DL (ref ?–2)
WBC # BLD AUTO: 6.2 K/UL (ref 3.8–10.6)

## 2019-10-06 PROCEDURE — 87086 URINE CULTURE/COLONY COUNT: CPT

## 2019-10-06 PROCEDURE — 85025 COMPLETE CBC W/AUTO DIFF WBC: CPT

## 2019-10-06 PROCEDURE — 87077 CULTURE AEROBIC IDENTIFY: CPT

## 2019-10-06 PROCEDURE — 96375 TX/PRO/DX INJ NEW DRUG ADDON: CPT

## 2019-10-06 PROCEDURE — 99284 EMERGENCY DEPT VISIT MOD MDM: CPT

## 2019-10-06 PROCEDURE — 87186 SC STD MICRODIL/AGAR DIL: CPT

## 2019-10-06 PROCEDURE — 96374 THER/PROPH/DIAG INJ IV PUSH: CPT

## 2019-10-06 PROCEDURE — 82150 ASSAY OF AMYLASE: CPT

## 2019-10-06 PROCEDURE — 83690 ASSAY OF LIPASE: CPT

## 2019-10-06 PROCEDURE — 76705 ECHO EXAM OF ABDOMEN: CPT

## 2019-10-06 PROCEDURE — 36415 COLL VENOUS BLD VENIPUNCTURE: CPT

## 2019-10-06 PROCEDURE — 80053 COMPREHEN METABOLIC PANEL: CPT

## 2019-10-06 PROCEDURE — 81001 URINALYSIS AUTO W/SCOPE: CPT

## 2019-10-06 NOTE — US
EXAMINATION TYPE: US abdomen limited

 

DATE OF EXAM: 10/6/2019

 

COMPARISON: CT

 

CLINICAL HISTORY: left side pain. Pt states left side ABD pain

 

EXAM MEASUREMENTS:

Spleen: 9.2 cm

Left Kidney: 10.1 x 4.9 x 4.7 cm

 

1.  Spleen:  wnl

2.  Left Kidney:  wnl  

 

**No abnormality visualized within LUQ

 

IMPRESSION: 

 

NORMAL LEFT UPPER QUADRANT ULTRASOUND

## 2019-10-06 NOTE — ED
Abdominal Pain HPI





- General


Chief Complaint: Abdominal Pain


Stated Complaint: Side Pain


Time Seen by Provider: 10/06/19 06:42


Source: patient, RN notes reviewed


Mode of arrival: ambulatory


Limitations: no limitations





- History of Present Illness


Initial Comments: 





This a 39-year-old female presents emergency Department with chief complaint of 

left-sided pain.  Patient states she still has some intermittent pain and she 

was told this is from her hernia repair were by Dr. Lojaania states now the pain

is more constant.  She states in her left upper side.  She has no lower pelvic 

pain.  Patient has no dysuria no hematuria.  Patient has no diarrhea no 

constipation.  Patient states that she's had prior tubal ligation and hernia 

repair no other abdominal surgeries.  She's had no prior abdominal infections 

including colitis, diverticulitis.  Patient states that nothing really makes the

pain feel better she states that she coughs this increases her pain in her 

abdomen





- Related Data


                                Home Medications











 Medication  Instructions  Recorded  Confirmed


 


Ferrous Sulfate [Feosol] 325 mg PO DAILY 02/02/19 10/06/19








                                  Previous Rx's











 Medication  Instructions  Recorded


 


Cephalexin [Keflex] 500 mg PO Q8HR #21 cap 10/06/19











                                    Allergies











Allergy/AdvReac Type Severity Reaction Status Date / Time


 


sulfamethoxazole Allergy  Rash/Hives Verified 10/06/19 08:05





[From Bactrim]     


 


trimethoprim [From Bactrim] Allergy  Rash/Hives Verified 10/06/19 08:05














Review of Systems


ROS Statement: 


Those systems with pertinent positive or pertinent negative responses have been 

documented in the HPI.





ROS Other: All systems not noted in ROS Statement are negative.





Past Medical History


Past Medical History: No Reported History


Additional Past Medical History / Comment(s): Anemia secondary to menorrhagia


History of Any Multi-Drug Resistant Organisms: None Reported


Past Surgical History:  Section, Hernia Repair, Tubal Ligation


Additional Past Surgical History / Comment(s): ganglion cyst removal-left wrist


Past Anesthesia/Blood Transfusion Reactions: No Reported Reaction


Past Psychological History: No Psychological Hx Reported


Smoking Status: Current every day smoker


Past Alcohol Use History: Occasional


Past Drug Use History: None Reported





- Past Family History


  ** Mother


Family Medical History: Hypertension





  ** Father


Family Medical History: Cancer





General Exam


Limitations: no limitations


General appearance: alert, in no apparent distress


Head exam: Present: atraumatic, normocephalic, normal inspection


Eye exam: Present: normal appearance, PERRL, EOMI.  Absent: scleral icterus, 

conjunctival injection, periorbital swelling


ENT exam: Present: normal exam, mucous membranes moist


Neck exam: Present: normal inspection, full ROM.  Absent: tenderness, 

meningismus, lymphadenopathy


Respiratory exam: Present: normal lung sounds bilaterally.  Absent: respiratory 

distress, wheezes, rales, rhonchi, stridor


Cardiovascular Exam: Present: regular rate, normal rhythm, normal heart sounds. 

Absent: systolic murmur, diastolic murmur, rubs, gallop, clicks


GI/Abdominal exam: Present: soft, tenderness (Minimal tenderness in the left 

side primarily upper region), normal bowel sounds.  Absent: distended, guarding,

rebound, rigid


Back exam: Absent: CVA tenderness (R), CVA tenderness (L)


Neurological exam: Present: alert, oriented X3


Skin exam: Present: warm, dry, intact, normal color.  Absent: rash





Course


                                   Vital Signs











  10/06/19





  06:43


 


Temperature 97.7 F


 


Pulse Rate 77


 


Respiratory 19





Rate 


 


Blood Pressure 115/71


 


O2 Sat by Pulse 100





Oximetry 














Medical Decision Making





- Medical Decision Making





39-year-old female presents emergency Department for left-sided abdominal 

discomfort.  Patient had a workup including labs urinalysis and also.  Patient's

found to have urinary tract infection.  Patient has history of anemia which is 

chronic in nature.  Patient advised to restart her iron she states that she 

prefers not to take it because it constipates her.  We did discuss taking a 

stool softener.  Patient will follow-up for recheck and return for any worsening

symptoms.





- Lab Data


Result diagrams: 


                                 10/06/19 07:03





                                 10/06/19 07:03


                                   Lab Results











  10/06/19 10/06/19 10/06/19 Range/Units





  07:03 07:03 07:32 


 


WBC   6.2   (3.8-10.6)  k/uL


 


RBC   4.02   (3.80-5.40)  m/uL


 


Hgb   7.5 L   (11.4-16.0)  gm/dL


 


Hct   27.7 L   (34.0-46.0)  %


 


MCV   69.1 L   (80.0-100.0)  fL


 


MCH   18.8 L   (25.0-35.0)  pg


 


MCHC   27.2 L   (31.0-37.0)  g/dL


 


RDW   23.6 H   (11.5-15.5)  %


 


Plt Count   245   (150-450)  k/uL


 


Neutrophils %   56   %


 


Lymphocytes %   32   %


 


Monocytes %   6   %


 


Eosinophils %   2   %


 


Basophils %   1   %


 


Neutrophils #   3.5   (1.3-7.7)  k/uL


 


Lymphocytes #   2.0   (1.0-4.8)  k/uL


 


Monocytes #   0.4   (0-1.0)  k/uL


 


Eosinophils #   0.1   (0-0.7)  k/uL


 


Basophils #   0.1   (0-0.2)  k/uL


 


Hypochromasia   Marked   


 


Poikilocytosis   Slight   


 


Anisocytosis   Moderate   


 


Microcytosis   Marked   


 


Sodium  137    (137-145)  mmol/L


 


Potassium  4.0    (3.5-5.1)  mmol/L


 


Chloride  103    ()  mmol/L


 


Carbon Dioxide  23    (22-30)  mmol/L


 


Anion Gap  11    mmol/L


 


BUN  15    (7-17)  mg/dL


 


Creatinine  0.56    (0.52-1.04)  mg/dL


 


Est GFR (CKD-EPI)AfAm  >90    (>60 ml/min/1.73 sqM)  


 


Est GFR (CKD-EPI)NonAf  >90    (>60 ml/min/1.73 sqM)  


 


Glucose  86    (74-99)  mg/dL


 


Calcium  9.1    (8.4-10.2)  mg/dL


 


Total Bilirubin  0.3    (0.2-1.3)  mg/dL


 


AST  22    (14-36)  U/L


 


ALT  13    (9-52)  U/L


 


Alkaline Phosphatase  53    ()  U/L


 


Total Protein  7.2    (6.3-8.2)  g/dL


 


Albumin  4.3    (3.5-5.0)  g/dL


 


Amylase  59    ()  U/L


 


Lipase  79    ()  U/L


 


Urine Color    Light Yellow  


 


Urine Appearance    Cloudy H  (Clear)  


 


Urine pH    6.0  (5.0-8.0)  


 


Ur Specific Gravity    1.005  (1.001-1.035)  


 


Urine Protein    Trace H  (Negative)  


 


Urine Glucose (UA)    Negative  (Negative)  


 


Urine Ketones    Negative  (Negative)  


 


Urine Blood    Large H  (Negative)  


 


Urine Nitrite    Negative  (Negative)  


 


Urine Bilirubin    Negative  (Negative)  


 


Urine Urobilinogen    <2.0  (<2.0)  mg/dL


 


Ur Leukocyte Esterase    Large H  (Negative)  


 


Urine RBC    57 H  (0-5)  /hpf


 


Urine WBC    174 H  (0-5)  /hpf


 


Urine WBC Clumps    Moderate H  (None)  /hpf


 


Urine Mucus    Rare H  (None)  /hpf














Disposition


Clinical Impression: 


 Anemia, UTI (urinary tract infection)





Disposition: HOME SELF-CARE


Condition: Stable


Instructions (If sedation given, give patient instructions):  Urinary Tract 

Infection in Men (ED)


Additional Instructions: 


Please return to the Emergency Department if symptoms worsen or any other 

concerns.


Prescriptions: 


Cephalexin [Keflex] 500 mg PO Q8HR #21 cap


Is patient prescribed a controlled substance at d/c from ED?: No


Referrals: 


Pankaj Resendiz MD [Primary Care Provider] - 1-2 days

## 2019-11-16 ENCOUNTER — HOSPITAL ENCOUNTER (OUTPATIENT)
Dept: HOSPITAL 47 - RADCTMAIN | Age: 39
End: 2019-11-16
Attending: INTERNAL MEDICINE
Payer: COMMERCIAL

## 2019-11-16 DIAGNOSIS — Z88.2: ICD-10-CM

## 2019-11-16 DIAGNOSIS — K76.9: ICD-10-CM

## 2019-11-16 DIAGNOSIS — N28.9: ICD-10-CM

## 2019-11-16 DIAGNOSIS — N32.89: ICD-10-CM

## 2019-11-16 DIAGNOSIS — N85.2: ICD-10-CM

## 2019-11-16 DIAGNOSIS — D25.9: Primary | ICD-10-CM

## 2019-11-16 PROCEDURE — 74177 CT ABD & PELVIS W/CONTRAST: CPT

## 2019-11-17 NOTE — CT
EXAMINATION TYPE: CT abdomen pelvis w con

 

DATE OF EXAM: 11/16/2019

 

HISTORY: Left lower quadrant pain

 

CT DLP: 456.8mGycm  Automated Exposure Control for Dose Reduction was Utilized.

 

CONTRAST: 

CT scan of the abdomen and pelvis is performed with IV Contrast, patient injected with 100 mL of Isov
ue 300.

 

COMPARISON: 6/15/2018.

 

FINDINGS:

 

LUNG BASES: No significant abnormality is appreciated.

 

LIVER/GB: There is a punctate 3 mm hepatic lesion on series 3 image 25 that is too small to accuratel
y characterize. Remainder the liver is unremarkable. No intrahepatic biliary ductal dilatation. No ch
olelithiasis on CT.

 

PANCREAS: No significant abnormality is seen.

 

SPLEEN: No significant abnormality is seen. No splenomegaly.

 

ADRENALS: No nodularity or thickening.

 

KIDNEYS: Too small to accurately characterize exophytic right upper pole renal lesion is seen on seri
es 3 image 19 near the hepatorenal fossa. No hydronephrosis of either kidney.

 

BOWEL: The dilated urinary bladder and enlarged uterus impress upon the incompletely distended tortuo
us redundant sigmoid colon without current obstruction. Moderate degree colonic fecal stasis overall.


 

UTERUS/ADNEXA: There is redemonstration of an enlarged fibroid uterus that overall appears similar to
 the prior 2018 with an enhancing posterior lower uterine segment probable leiomyoma measuring approx
imately 2.8 cm on sagittal series 9 image 60. There are bilateral ovarian follicles/cysts, greater on
 the right than left. Endometrial thickness appears within normal limits measuring 6 mm.

 

LYMPH NODES: No greater than 1cm abdominal or pelvic lymph nodes are appreciated.

 

OSSEOUS STRUCTURES: Very minimal multilevel degenerative disc disease.

 

 

IMPRESSION:

1. Enlarged and heterogenous fibroid uterus and follicular/cystic changes of the ovaries (right great
er than left). Findings could be further evaluated with pelvic ultrasound.

2. Marked distention of the urinary bladder, together with the enlarged uterus occupying most of the 
pelvis and have mass effect on the bowel loops. No current obstruction.

3. Too small to accurately characterize hepatic lesion renal lesion.

## 2020-01-24 ENCOUNTER — HOSPITAL ENCOUNTER (OUTPATIENT)
Dept: HOSPITAL 47 - LABPAT | Age: 40
End: 2020-01-24
Attending: OBSTETRICS & GYNECOLOGY
Payer: COMMERCIAL

## 2020-01-24 DIAGNOSIS — Z01.812: Primary | ICD-10-CM

## 2020-01-24 LAB
BASOPHILS # BLD AUTO: 0 K/UL (ref 0–0.2)
BASOPHILS NFR BLD AUTO: 1 %
EOSINOPHIL # BLD AUTO: 0 K/UL (ref 0–0.7)
EOSINOPHIL NFR BLD AUTO: 1 %
ERYTHROCYTE [DISTWIDTH] IN BLOOD BY AUTOMATED COUNT: 4.24 M/UL (ref 3.8–5.4)
ERYTHROCYTE [DISTWIDTH] IN BLOOD: 22.5 % (ref 11.5–15.5)
HCT VFR BLD AUTO: 29.3 % (ref 34–46)
HGB BLD-MCNC: 7.4 GM/DL (ref 11.4–16)
LYMPHOCYTES # SPEC AUTO: 0.6 K/UL (ref 1–4.8)
LYMPHOCYTES NFR SPEC AUTO: 15 %
MCH RBC QN AUTO: 17.4 PG (ref 25–35)
MCHC RBC AUTO-ENTMCNC: 25.2 G/DL (ref 31–37)
MCV RBC AUTO: 69.1 FL (ref 80–100)
MONOCYTES # BLD AUTO: 0.2 K/UL (ref 0–1)
MONOCYTES NFR BLD AUTO: 6 %
NEUTROPHILS # BLD AUTO: 3.4 K/UL (ref 1.3–7.7)
NEUTROPHILS NFR BLD AUTO: 77 %
PLATELET # BLD AUTO: 98 K/UL (ref 150–450)
WBC # BLD AUTO: 4.4 K/UL (ref 3.8–10.6)

## 2020-01-24 PROCEDURE — 85025 COMPLETE CBC W/AUTO DIFF WBC: CPT

## 2020-01-24 PROCEDURE — 36415 COLL VENOUS BLD VENIPUNCTURE: CPT

## 2020-01-26 ENCOUNTER — HOSPITAL ENCOUNTER (EMERGENCY)
Dept: HOSPITAL 47 - EC | Age: 40
Discharge: HOME | End: 2020-01-26
Payer: COMMERCIAL

## 2020-01-26 VITALS — DIASTOLIC BLOOD PRESSURE: 57 MMHG | HEART RATE: 75 BPM | SYSTOLIC BLOOD PRESSURE: 96 MMHG

## 2020-01-26 VITALS — RESPIRATION RATE: 18 BRPM | TEMPERATURE: 97.9 F

## 2020-01-26 DIAGNOSIS — Z98.890: ICD-10-CM

## 2020-01-26 DIAGNOSIS — N92.0: Primary | ICD-10-CM

## 2020-01-26 DIAGNOSIS — Z98.51: ICD-10-CM

## 2020-01-26 DIAGNOSIS — Z88.2: ICD-10-CM

## 2020-01-26 DIAGNOSIS — Z88.1: ICD-10-CM

## 2020-01-26 DIAGNOSIS — F17.200: ICD-10-CM

## 2020-01-26 DIAGNOSIS — Z79.899: ICD-10-CM

## 2020-01-26 LAB
ALBUMIN SERPL-MCNC: 3.8 G/DL (ref 3.5–5)
ALP SERPL-CCNC: 41 U/L (ref 38–126)
ALT SERPL-CCNC: 8 U/L (ref 4–34)
ANION GAP SERPL CALC-SCNC: 6 MMOL/L
AST SERPL-CCNC: 22 U/L (ref 14–36)
BUN SERPL-SCNC: 11 MG/DL (ref 7–17)
CALCIUM SPEC-MCNC: 8.7 MG/DL (ref 8.4–10.2)
CELLS COUNTED: 100
CHLORIDE SERPL-SCNC: 109 MMOL/L (ref 98–107)
CO2 SERPL-SCNC: 25 MMOL/L (ref 22–30)
EOSINOPHIL # BLD MANUAL: 0.03 K/UL (ref 0–0.7)
ERYTHROCYTE [DISTWIDTH] IN BLOOD BY AUTOMATED COUNT: 3.47 M/UL (ref 3.8–5.4)
ERYTHROCYTE [DISTWIDTH] IN BLOOD: 22.6 % (ref 11.5–15.5)
GLUCOSE SERPL-MCNC: 84 MG/DL (ref 74–99)
HCT VFR BLD AUTO: 23.6 % (ref 34–46)
HGB BLD-MCNC: 6.5 GM/DL (ref 11.4–16)
LYMPHOCYTES # BLD MANUAL: 0.83 K/UL (ref 1–4.8)
MCH RBC QN AUTO: 18.6 PG (ref 25–35)
MCHC RBC AUTO-ENTMCNC: 27.4 G/DL (ref 31–37)
MCV RBC AUTO: 68 FL (ref 80–100)
MONOCYTES # BLD MANUAL: 0.22 K/UL (ref 0–1)
NEUTROPHILS NFR BLD MANUAL: 66 %
NEUTS SEG # BLD MANUAL: 2.11 K/UL (ref 1.3–7.7)
PH UR: 6 [PH] (ref 5–8)
PLATELET # BLD AUTO: 203 K/UL (ref 150–450)
POTASSIUM SERPL-SCNC: 3.5 MMOL/L (ref 3.5–5.1)
PROT SERPL-MCNC: 6.3 G/DL (ref 6.3–8.2)
PROT UR QL: (no result)
RBC UR QL: >182 /HPF (ref 0–5)
SODIUM SERPL-SCNC: 140 MMOL/L (ref 137–145)
SP GR UR: 1.02 (ref 1–1.03)
SQUAMOUS UR QL AUTO: 2 /HPF (ref 0–4)
UROBILINOGEN UR QL STRIP: <2 MG/DL (ref ?–2)
WBC # BLD AUTO: 3.2 K/UL (ref 3.8–10.6)
WBC #/AREA URNS HPF: 4 /HPF (ref 0–5)

## 2020-01-26 PROCEDURE — 83605 ASSAY OF LACTIC ACID: CPT

## 2020-01-26 PROCEDURE — 85025 COMPLETE CBC W/AUTO DIFF WBC: CPT

## 2020-01-26 PROCEDURE — 81001 URINALYSIS AUTO W/SCOPE: CPT

## 2020-01-26 PROCEDURE — 36415 COLL VENOUS BLD VENIPUNCTURE: CPT

## 2020-01-26 PROCEDURE — 96375 TX/PRO/DX INJ NEW DRUG ADDON: CPT

## 2020-01-26 PROCEDURE — 87502 INFLUENZA DNA AMP PROBE: CPT

## 2020-01-26 PROCEDURE — 80053 COMPREHEN METABOLIC PANEL: CPT

## 2020-01-26 PROCEDURE — 86920 COMPATIBILITY TEST SPIN: CPT

## 2020-01-26 PROCEDURE — 86900 BLOOD TYPING SEROLOGIC ABO: CPT

## 2020-01-26 PROCEDURE — 86901 BLOOD TYPING SEROLOGIC RH(D): CPT

## 2020-01-26 PROCEDURE — 99285 EMERGENCY DEPT VISIT HI MDM: CPT

## 2020-01-26 PROCEDURE — 36430 TRANSFUSION BLD/BLD COMPNT: CPT

## 2020-01-26 PROCEDURE — 81025 URINE PREGNANCY TEST: CPT

## 2020-01-26 PROCEDURE — 86850 RBC ANTIBODY SCREEN: CPT

## 2020-01-26 PROCEDURE — 96365 THER/PROPH/DIAG IV INF INIT: CPT

## 2020-01-26 NOTE — ED
General Adult HPI





- General


Chief complaint: Recheck/Abnormal Lab/Rx


Stated complaint: Weak/lightheaded


Time Seen by Provider: 20 12:55


Source: patient


Mode of arrival: ambulatory


Limitations: no limitations





- History of Present Illness


Initial comments: 





The patient is a 39-year-old female with past history of menorrhagia and blood 

loss anemia who presents to the emergency room with reported headaches, chills 

and diarrhea.  She states her symptoms have been present for the past 4 days.  

She admits to generalized weakness.  Denies any fevers.  No visual changes.  

Denies any neck pain.  No cough or hemoptysis.  Denies any abdominal pain.  She 

currently is on her menstrual cycle and is bleeding heavily.  She states that 

she has a history of blood loss anemia and is having a ablation by Dr. Randolph on

Tuesday.  She had blood work completed 2 days ago and it showed that her 

hemoglobin was in the sevens.  Concerned that her weakness is due to low 

hemoglobin level.  She presents with her daughter at bedside.  He is concerned 

that she may have caught a virus and pass it onto her daughter as her daughters 

complaining the same symptoms.  She denies any recent antibiotic use or travel. 

No episodes of diarrhea today.  Denies any additional sick contacts.  There are 

no alleviating, precipitating or modifying factors





- Related Data


                                Home Medications











 Medication  Instructions  Recorded  Confirmed


 


Sod Phos Di, Mono/K Phos Mono 250 mg PO ONCE 20





[Phosphorous 250 mg Tablet]   








                                  Previous Rx's











 Medication  Instructions  Recorded


 


Ibuprofen [Motrin] 600 mg PO Q6HR PRN #30 tab 20











                                    Allergies











Allergy/AdvReac Type Severity Reaction Status Date / Time


 


sulfamethoxazole Allergy  Rash/Hives Verified 20 08:06





[From Bactrim]     


 


trimethoprim [From Bactrim] Allergy  Rash/Hives Verified 20 08:06














Review of Systems


ROS Statement: 


Those systems with pertinent positive or pertinent negative responses have been 

documented in the HPI.





ROS Other: All systems not noted in ROS Statement are negative.





Past Medical History


Past Medical History: No Reported History


Additional Past Medical History / Comment(s): Anemia secondary to menorrhagia


History of Any Multi-Drug Resistant Organisms: None Reported


Past Surgical History:  Section, Hernia Repair, Tubal Ligation


Additional Past Surgical History / Comment(s): ganglion cyst removal-left wrist,

umbilical hernia repair


Past Anesthesia/Blood Transfusion Reactions: No Reported Reaction, Motion 

Sickness


Past Psychological History: No Psychological Hx Reported


Smoking Status: Current every day smoker


Past Alcohol Use History: Occasional


Past Drug Use History: None Reported





- Past Family History


  ** Mother


Family Medical History: Hypertension





  ** Father


Family Medical History: Cancer


Additional Family Medical History / Comment(s): prostate cancer





General Exam


Limitations: no limitations


General appearance: alert, in no apparent distress


Head exam: Present: atraumatic, normocephalic, normal inspection


Eye exam: Present: normal appearance, PERRL, EOMI.  Absent: scleral icterus, 

conjunctival injection, periorbital swelling


ENT exam: Present: normal exam, mucous membranes moist


Neck exam: Present: normal inspection.  Absent: tenderness, meningismus, ly

mphadenopathy


Respiratory exam: Present: normal lung sounds bilaterally.  Absent: respiratory 

distress, wheezes, rales, rhonchi, stridor


Cardiovascular Exam: Present: regular rate, normal rhythm, normal heart sounds. 

Absent: systolic murmur, diastolic murmur, rubs, gallop, clicks


GI/Abdominal exam: Present: soft, normal bowel sounds.  Absent: distended, 

tenderness, guarding, rebound, rigid


Extremities exam: Present: normal inspection, full ROM, normal capillary refill.

 Absent: tenderness, pedal edema, joint swelling, calf tenderness


Back exam: Present: normal inspection


Neurological exam: Present: alert, oriented X3, CN II-XII intact


Psychiatric exam: Present: normal affect, normal mood


Skin exam: Present: warm, dry, intact, normal color.  Absent: rash





Course


                                   Vital Signs











  20





  12:54 17:51 18:01


 


Temperature 97.8 F 98.9 F 98.4 F


 


Pulse Rate 96 73 80


 


Respiratory 18 18 18





Rate   


 


Blood Pressure 96/61 97/55 98/64


 


O2 Sat by Pulse 99 100 





Oximetry   














  20





  18:31 19:48 20:49


 


Temperature 97.8 F 97.9 F 97.9 F


 


Pulse Rate 76 70 75


 


Respiratory 17 18 18





Rate   


 


Blood Pressure 101/64 95/57 96/57


 


O2 Sat by Pulse 100 100 100





Oximetry   














Medical Decision Making





- Medical Decision Making





Upon arrival the patient was placed into room 28.  A thorough history and 

physical exam is performed.  She does present with her daughter at bedside who 

has similar complaints.  I did swab her for the flu.  I also requested a urine 

sample.  As the patient is reporting a history of significant anemia due to 

menorrhagia I did recommend repeating the patient's hemoglobin as well as a CMP.

 Hemoglobin is 6.5.  White blood cell count 3.2.  CMP is unremarkable.  

Urinalysis shows 1182 red blood cells.  Influenza A and B are not detected.  I 

discussed this with the patient.  As she does feel significantly weak with a 

hemoglobin that has dropped almost a gram in 2 days I did recommend transfusion.

 The patient did agree to this and has had transfusions before in the past.  She

is typed and screened.  She is transfused 1 unit.  I discussed diagnosis, 

differential treatment options.  I called and discussed the case with Dr. Perez. 

She does request the patient have a repeat CBC done tomorrow.  I did provide the

patient with a prescription which she is to bring to the outpatient lab.  She 

has any new or worsening symptoms she should return to the emergency room.  

Patient was then discharged home in stable condition





- Lab Data


Result diagrams: 


                                 20 14:54





                                 20 14:54


                                   Lab Results











  20 Range/Units





  14:10 14:15 14:15 


 


WBC     (3.8-10.6)  k/uL


 


RBC     (3.80-5.40)  m/uL


 


Hgb     (11.4-16.0)  gm/dL


 


Hct     (34.0-46.0)  %


 


MCV     (80.0-100.0)  fL


 


MCH     (25.0-35.0)  pg


 


MCHC     (31.0-37.0)  g/dL


 


RDW     (11.5-15.5)  %


 


Plt Count     (150-450)  k/uL


 


Neutrophils % (Manual)     %


 


Lymphocytes % (Manual)     %


 


Monocytes % (Manual)     %


 


Eosinophils % (Manual)     %


 


Neutrophils # (Manual)     (1.3-7.7)  k/uL


 


Lymphocytes # (Manual)     (1.0-4.8)  k/uL


 


Monocytes # (Manual)     (0-1.0)  k/uL


 


Eosinophils # (Manual)     (0-0.7)  k/uL


 


Nucleated RBCs     (0-0)  /100 WBC


 


Manual Slide Review     


 


Polychromasia     


 


Hypochromasia     


 


Poikilocytosis     


 


Anisocytosis     


 


Microcytosis     


 


Ovalocytes     


 


Sodium     (137-145)  mmol/L


 


Potassium     (3.5-5.1)  mmol/L


 


Chloride     ()  mmol/L


 


Carbon Dioxide     (22-30)  mmol/L


 


Anion Gap     mmol/L


 


BUN     (7-17)  mg/dL


 


Creatinine     (0.52-1.04)  mg/dL


 


Est GFR (CKD-EPI)AfAm     (>60 ml/min/1.73 sqM)  


 


Est GFR (CKD-EPI)NonAf     (>60 ml/min/1.73 sqM)  


 


Glucose     (74-99)  mg/dL


 


Plasma Lactic Acid Shilo     (0.7-2.0)  mmol/L


 


Calcium     (8.4-10.2)  mg/dL


 


Total Bilirubin     (0.2-1.3)  mg/dL


 


AST     (14-36)  U/L


 


ALT     (4-34)  U/L


 


Alkaline Phosphatase     ()  U/L


 


Total Protein     (6.3-8.2)  g/dL


 


Albumin     (3.5-5.0)  g/dL


 


Urine Color    Light Red  


 


Urine Appearance    Clear  (Clear)  


 


Urine pH    6.0  (5.0-8.0)  


 


Ur Specific Gravity    1.022  (1.001-1.035)  


 


Urine Protein    1+ H  (Negative)  


 


Urine Glucose (UA)    Negative  (Negative)  


 


Urine Ketones    Negative  (Negative)  


 


Urine Blood    Large H  (Negative)  


 


Urine Nitrite    Negative  (Negative)  


 


Urine Bilirubin    Negative  (Negative)  


 


Urine Urobilinogen    <2.0  (<2.0)  mg/dL


 


Ur Leukocyte Esterase    Negative  (Negative)  


 


Urine RBC    >182 H  (0-5)  /hpf


 


Urine WBC    4  (0-5)  /hpf


 


Ur Squamous Epith Cells    2  (0-4)  /hpf


 


Urine Mucus    Occasional H  (None)  /hpf


 


Urine HCG, Qual   Not Detected   (Not Detectd)  


 


Influenza Type A RNA  Not Detected    (Not Detectd)  


 


Influenza Type B (PCR)  Not Detected    (Not Detectd)  


 


Blood Type     


 


Blood Type Recheck     


 


Bld Type Recheck Status     


 


Antibody Screen     


 


Crossmatch     


 


Spec Expiration Date     














  20 Range/Units





  14:54 14:54 14:54 


 


WBC  3.2 L    (3.8-10.6)  k/uL


 


RBC  3.47 L    (3.80-5.40)  m/uL


 


Hgb  6.5 L*    (11.4-16.0)  gm/dL


 


Hct  23.6 L    (34.0-46.0)  %


 


MCV  68.0 L    (80.0-100.0)  fL


 


MCH  18.6 L    (25.0-35.0)  pg


 


MCHC  27.4 L    (31.0-37.0)  g/dL


 


RDW  22.6 H    (11.5-15.5)  %


 


Plt Count  203  D    (150-450)  k/uL


 


Neutrophils % (Manual)  66    %


 


Lymphocytes % (Manual)  26    %


 


Monocytes % (Manual)  7    %


 


Eosinophils % (Manual)  1    %


 


Neutrophils # (Manual)  2.11    (1.3-7.7)  k/uL


 


Lymphocytes # (Manual)  0.83 L    (1.0-4.8)  k/uL


 


Monocytes # (Manual)  0.22    (0-1.0)  k/uL


 


Eosinophils # (Manual)  0.03    (0-0.7)  k/uL


 


Nucleated RBCs  0    (0-0)  /100 WBC


 


Manual Slide Review  Performed    


 


Polychromasia  Present    


 


Hypochromasia  Marked    


 


Poikilocytosis  Slight    


 


Anisocytosis  Moderate    


 


Microcytosis  Marked    


 


Ovalocytes  Present    


 


Sodium   140   (137-145)  mmol/L


 


Potassium   3.5   (3.5-5.1)  mmol/L


 


Chloride   109 H   ()  mmol/L


 


Carbon Dioxide   25   (22-30)  mmol/L


 


Anion Gap   6   mmol/L


 


BUN   11   (7-17)  mg/dL


 


Creatinine   0.55   (0.52-1.04)  mg/dL


 


Est GFR (CKD-EPI)AfAm   >90   (>60 ml/min/1.73 sqM)  


 


Est GFR (CKD-EPI)NonAf   >90   (>60 ml/min/1.73 sqM)  


 


Glucose   84   (74-99)  mg/dL


 


Plasma Lactic Acid Shilo    1.0  (0.7-2.0)  mmol/L


 


Calcium   8.7   (8.4-10.2)  mg/dL


 


Total Bilirubin   0.3   (0.2-1.3)  mg/dL


 


AST   22   (14-36)  U/L


 


ALT   8   (4-34)  U/L


 


Alkaline Phosphatase   41   ()  U/L


 


Total Protein   6.3   (6.3-8.2)  g/dL


 


Albumin   3.8   (3.5-5.0)  g/dL


 


Urine Color     


 


Urine Appearance     (Clear)  


 


Urine pH     (5.0-8.0)  


 


Ur Specific Gravity     (1.001-1.035)  


 


Urine Protein     (Negative)  


 


Urine Glucose (UA)     (Negative)  


 


Urine Ketones     (Negative)  


 


Urine Blood     (Negative)  


 


Urine Nitrite     (Negative)  


 


Urine Bilirubin     (Negative)  


 


Urine Urobilinogen     (<2.0)  mg/dL


 


Ur Leukocyte Esterase     (Negative)  


 


Urine RBC     (0-5)  /hpf


 


Urine WBC     (0-5)  /hpf


 


Ur Squamous Epith Cells     (0-4)  /hpf


 


Urine Mucus     (None)  /hpf


 


Urine HCG, Qual     (Not Detectd)  


 


Influenza Type A RNA     (Not Detectd)  


 


Influenza Type B (PCR)     (Not Detectd)  


 


Blood Type     


 


Blood Type Recheck     


 


Bld Type Recheck Status     


 


Antibody Screen     


 


Crossmatch     


 


Spec Expiration Date     














  20 Range/Units





  14:54 


 


WBC   (3.8-10.6)  k/uL


 


RBC   (3.80-5.40)  m/uL


 


Hgb   (11.4-16.0)  gm/dL


 


Hct   (34.0-46.0)  %


 


MCV   (80.0-100.0)  fL


 


MCH   (25.0-35.0)  pg


 


MCHC   (31.0-37.0)  g/dL


 


RDW   (11.5-15.5)  %


 


Plt Count   (150-450)  k/uL


 


Neutrophils % (Manual)   %


 


Lymphocytes % (Manual)   %


 


Monocytes % (Manual)   %


 


Eosinophils % (Manual)   %


 


Neutrophils # (Manual)   (1.3-7.7)  k/uL


 


Lymphocytes # (Manual)   (1.0-4.8)  k/uL


 


Monocytes # (Manual)   (0-1.0)  k/uL


 


Eosinophils # (Manual)   (0-0.7)  k/uL


 


Nucleated RBCs   (0-0)  /100 WBC


 


Manual Slide Review   


 


Polychromasia   


 


Hypochromasia   


 


Poikilocytosis   


 


Anisocytosis   


 


Microcytosis   


 


Ovalocytes   


 


Sodium   (137-145)  mmol/L


 


Potassium   (3.5-5.1)  mmol/L


 


Chloride   ()  mmol/L


 


Carbon Dioxide   (22-30)  mmol/L


 


Anion Gap   mmol/L


 


BUN   (7-17)  mg/dL


 


Creatinine   (0.52-1.04)  mg/dL


 


Est GFR (CKD-EPI)AfAm   (>60 ml/min/1.73 sqM)  


 


Est GFR (CKD-EPI)NonAf   (>60 ml/min/1.73 sqM)  


 


Glucose   (74-99)  mg/dL


 


Plasma Lactic Acid Shilo   (0.7-2.0)  mmol/L


 


Calcium   (8.4-10.2)  mg/dL


 


Total Bilirubin   (0.2-1.3)  mg/dL


 


AST   (14-36)  U/L


 


ALT   (4-34)  U/L


 


Alkaline Phosphatase   ()  U/L


 


Total Protein   (6.3-8.2)  g/dL


 


Albumin   (3.5-5.0)  g/dL


 


Urine Color   


 


Urine Appearance   (Clear)  


 


Urine pH   (5.0-8.0)  


 


Ur Specific Gravity   (1.001-1.035)  


 


Urine Protein   (Negative)  


 


Urine Glucose (UA)   (Negative)  


 


Urine Ketones   (Negative)  


 


Urine Blood   (Negative)  


 


Urine Nitrite   (Negative)  


 


Urine Bilirubin   (Negative)  


 


Urine Urobilinogen   (<2.0)  mg/dL


 


Ur Leukocyte Esterase   (Negative)  


 


Urine RBC   (0-5)  /hpf


 


Urine WBC   (0-5)  /hpf


 


Ur Squamous Epith Cells   (0-4)  /hpf


 


Urine Mucus   (None)  /hpf


 


Urine HCG, Qual   (Not Detectd)  


 


Influenza Type A RNA   (Not Detectd)  


 


Influenza Type B (PCR)   (Not Detectd)  


 


Blood Type  AB Positive  


 


Blood Type Recheck  AB Pos  


 


Bld Type Recheck Status  No  


 


Antibody Screen  NEGATIVE  


 


Crossmatch  See Detail  


 


Spec Expiration Date  2020 6874  














Critical Care Time


Critical Care Time: Yes


Critical Care Time: 





35 minutes as patient was provided a blood transfusion due to symptomatic 

anemia. I discussed the case with the on call OBGYN as the patient is schedule 

for upcoming surgery. 





Disposition


Clinical Impression: 


 Menorrhagia, Symptomatic anemia





Disposition: HOME SELF-CARE


Condition: Stable


Instructions (If sedation given, give patient instructions):  Anemia (ED)


Additional Instructions: 


Please come to the outpatient lab tomorrow to have your blood drawn.  Please fol

low up with Dr. Randolph on Tuesday for your surgery. Return to the ED for any new

or worsening symptoms 


Is patient prescribed a controlled substance at d/c from ED?: No


Referrals: 


Pankaj Resendiz MD [Primary Care Provider] - 1-2 days


Danica Randolph DO [Doctor of Osteopathic Medicine] - 1-2 days


Time of Disposition: 17:59

## 2020-01-27 ENCOUNTER — HOSPITAL ENCOUNTER (OUTPATIENT)
Dept: HOSPITAL 47 - LABWHC1 | Age: 40
Discharge: HOME | End: 2020-01-27
Attending: EMERGENCY MEDICINE
Payer: COMMERCIAL

## 2020-01-27 DIAGNOSIS — D50.0: Primary | ICD-10-CM

## 2020-01-27 LAB
BASOPHILS # BLD AUTO: 0 K/UL (ref 0–0.2)
BASOPHILS NFR BLD AUTO: 1 %
EOSINOPHIL # BLD AUTO: 0.1 K/UL (ref 0–0.7)
EOSINOPHIL NFR BLD AUTO: 3 %
ERYTHROCYTE [DISTWIDTH] IN BLOOD BY AUTOMATED COUNT: 4.07 M/UL (ref 3.8–5.4)
ERYTHROCYTE [DISTWIDTH] IN BLOOD: 21.9 % (ref 11.5–15.5)
HCT VFR BLD AUTO: 28.1 % (ref 34–46)
HGB BLD-MCNC: 7.6 GM/DL (ref 11.4–16)
LYMPHOCYTES # SPEC AUTO: 1.2 K/UL (ref 1–4.8)
LYMPHOCYTES NFR SPEC AUTO: 30 %
MCH RBC QN AUTO: 18.8 PG (ref 25–35)
MCHC RBC AUTO-ENTMCNC: 27.2 G/DL (ref 31–37)
MCV RBC AUTO: 69.2 FL (ref 80–100)
MONOCYTES # BLD AUTO: 0.3 K/UL (ref 0–1)
MONOCYTES NFR BLD AUTO: 6 %
NEUTROPHILS # BLD AUTO: 2.3 K/UL (ref 1.3–7.7)
NEUTROPHILS NFR BLD AUTO: 57 %
PLATELET # BLD AUTO: 334 K/UL (ref 150–450)
WBC # BLD AUTO: 4.1 K/UL (ref 3.8–10.6)

## 2020-01-27 PROCEDURE — 85025 COMPLETE CBC W/AUTO DIFF WBC: CPT

## 2020-01-27 PROCEDURE — 36416 COLLJ CAPILLARY BLOOD SPEC: CPT

## 2020-01-27 NOTE — P.HPOB
History of Present Illness


H&P Date: 20


Chief Complaint: Menorrhagia





39 year old  presents for D&C hysteroscopy and endometrial ablation with 

[facility].





Review of Systems


All systems: negative


Constitutional: Denies chills, Denies fever


Eyes: denies blurred vision, denies pain


Ears, nose, mouth and throat: Denies headache, Denies sore throat


Cardiovascular: Denies chest pain, Denies shortness of breath


Respiratory: Denies cough


Gastrointestinal: Denies abdominal pain, Denies diarrhea, Denies nausea, Denies 

vomiting


Genitourinary: Denies dysuria, Denies hematuria


Musculoskeletal: Denies myalgias


Integumentary: Denies pruritus, Denies rash


Neurological: Denies numbness, Denies weakness


Psychiatric: Denies anxiety, Denies depression


Endocrine: Denies fatigue, Denies weight change





Past Medical History


Past Medical History: No Reported History


Additional Past Medical History / Comment(s): Anemia secondary to menorrhagia, 

States scheduled for steroid injection with Dr. Higuera on 20 for 

pain in thigh.


History of Any Multi-Drug Resistant Organisms: None Reported


Past Surgical History:  Section, Hernia Repair, Tubal Ligation


Additional Past Surgical History / Comment(s): ganglion cyst removal-left wrist,

umbilical hernia repair


Past Anesthesia/Blood Transfusion Reactions: No Reported Reaction, Motion 

Sickness


Past Psychological History: No Psychological Hx Reported


Smoking Status: Current every day smoker


Past Alcohol Use History: Occasional


Additional Past Alcohol Use History / Comment(s): Smokes 5 cigarettes per day.  

smoking since age 18.


Past Drug Use History: None Reported





- Past Family History


  ** Mother


Family Medical History: Hypertension





  ** Father


Family Medical History: Cancer


Additional Family Medical History / Comment(s): prostate cancer





Medications and Allergies


                                Home Medications











 Medication  Instructions  Recorded  Confirmed  Type


 


No Known Home Medications  20 History








                                    Allergies











Allergy/AdvReac Type Severity Reaction Status Date / Time


 


sulfamethoxazole Allergy  Rash/Hives Verified 20 13:01





[From Bactrim]     


 


trimethoprim [From Bactrim] Allergy  Rash/Hives Verified 20 13:01














Exam


Osteopathic Statement: *.  No significant issues noted on an osteopathic 

structural exam other than those noted in the History and Physical/Consult.





Heart: RRR


Lungs: CTAB


Abdomen: soft, nontender


Extremeties: neg jose alberto's





Assessment and Plan


(1) Menorrhagia


Status: Acute   Code(s): N92.0 - EXCESSIVE AND FREQUENT MENSTRUATION WITH 

REGULAR CYCLE   SNOMED Code(s): 126479652


   


Plan: 





1. D&C hysteroscopy and endometrial ablation with [facility]

## 2020-01-28 ENCOUNTER — HOSPITAL ENCOUNTER (OUTPATIENT)
Dept: HOSPITAL 47 - OR | Age: 40
Discharge: HOME | End: 2020-01-28
Attending: OBSTETRICS & GYNECOLOGY
Payer: COMMERCIAL

## 2020-01-28 VITALS — SYSTOLIC BLOOD PRESSURE: 125 MMHG | HEART RATE: 75 BPM | DIASTOLIC BLOOD PRESSURE: 84 MMHG

## 2020-01-28 VITALS — TEMPERATURE: 97.3 F

## 2020-01-28 VITALS — RESPIRATION RATE: 16 BRPM

## 2020-01-28 VITALS — BODY MASS INDEX: 24.1 KG/M2

## 2020-01-28 DIAGNOSIS — F17.210: ICD-10-CM

## 2020-01-28 DIAGNOSIS — Z98.51: ICD-10-CM

## 2020-01-28 DIAGNOSIS — D50.0: ICD-10-CM

## 2020-01-28 DIAGNOSIS — Z88.2: ICD-10-CM

## 2020-01-28 DIAGNOSIS — Z98.891: ICD-10-CM

## 2020-01-28 DIAGNOSIS — Z98.890: ICD-10-CM

## 2020-01-28 DIAGNOSIS — N92.0: Primary | ICD-10-CM

## 2020-01-28 DIAGNOSIS — Z87.19: ICD-10-CM

## 2020-01-28 PROCEDURE — 81025 URINE PREGNANCY TEST: CPT

## 2020-01-28 PROCEDURE — 88305 TISSUE EXAM BY PATHOLOGIST: CPT

## 2020-01-28 PROCEDURE — 58563 HYSTEROSCOPY ABLATION: CPT

## 2021-10-25 ENCOUNTER — HOSPITAL ENCOUNTER (EMERGENCY)
Dept: HOSPITAL 47 - EC | Age: 41
Discharge: HOME | End: 2021-10-25
Payer: COMMERCIAL

## 2021-10-25 VITALS — DIASTOLIC BLOOD PRESSURE: 62 MMHG | TEMPERATURE: 98.8 F | HEART RATE: 65 BPM | SYSTOLIC BLOOD PRESSURE: 95 MMHG

## 2021-10-25 VITALS — RESPIRATION RATE: 18 BRPM

## 2021-10-25 DIAGNOSIS — F17.200: ICD-10-CM

## 2021-10-25 DIAGNOSIS — Z98.51: ICD-10-CM

## 2021-10-25 DIAGNOSIS — R51.9: Primary | ICD-10-CM

## 2021-10-25 DIAGNOSIS — Z88.2: ICD-10-CM

## 2021-10-25 DIAGNOSIS — Z20.822: ICD-10-CM

## 2021-10-25 DIAGNOSIS — Z88.1: ICD-10-CM

## 2021-10-25 DIAGNOSIS — B34.9: ICD-10-CM

## 2021-10-25 PROCEDURE — 99284 EMERGENCY DEPT VISIT MOD MDM: CPT

## 2021-10-25 PROCEDURE — 87635 SARS-COV-2 COVID-19 AMP PRB: CPT

## 2021-10-25 PROCEDURE — 96374 THER/PROPH/DIAG INJ IV PUSH: CPT

## 2021-10-25 PROCEDURE — 96375 TX/PRO/DX INJ NEW DRUG ADDON: CPT

## 2021-10-25 NOTE — ED
Headache HPI





- General


Chief Complaint: Headache


Stated Complaint: headache & eye pain


Time Seen by Provider: 10/25/21 16:18


Source: patient, RN notes reviewed


Mode of arrival: ambulatory


Limitations: no limitations





- History of Present Illness


Initial Comments: 





Patient is a 41-year-old female with history of anemia, presenting to the 

emergency Department with complaints of a headache, bilateral eye pain and 

bodyaches over the past 2 days.  Patient states it started as a mild headache 

and has progressed.  She is also complaining of body aches and chills.  She 

denies any fevers however she did arrive today with a low-grade temperature 

99.6.  No Tylenol Motrin today.  She did try over-the-counter Tylenol this 

morning for headache without improvement.  She does admit to some intermittent 

nausea, no vomiting or diarrhea.  She has been tolerating water.  No chest pain 

or shortness of breath, no cough.  She denies any abdominal pain.  She denies b

eing pregnant.  She has no further complaints.  Her vitals are stable upon 

arrival.





- Related Data


                                Home Medications











 Medication  Instructions  Recorded  Confirmed


 


Sod Phos Di, Mono/K Phos Mono 250 mg PO ONCE 20





[Phosphorous 250 mg Tablet]   








                                  Previous Rx's











 Medication  Instructions  Recorded


 


Ibuprofen [Motrin] 600 mg PO Q6HR PRN #30 tab 20











                                    Allergies











Allergy/AdvReac Type Severity Reaction Status Date / Time


 


sulfamethoxazole Allergy  Rash/Hives Verified 10/25/21 14:53





[From Bactrim]     


 


trimethoprim [From Bactrim] Allergy  Rash/Hives Verified 10/25/21 14:53














Review of Systems


ROS Statement: 


Those systems with pertinent positive or pertinent negative responses have been 

documented in the HPI.





ROS Other: All systems not noted in ROS Statement are negative.





Past Medical History


Past Medical History: No Reported History


Additional Past Medical History / Comment(s): Anemia secondary to menorrhagia


History of Any Multi-Drug Resistant Organisms: None Reported


Past Surgical History:  Section, Hernia Repair, Tubal Ligation


Additional Past Surgical History / Comment(s): ganglion cyst removal-left wrist,

umbilical hernia repair


Past Anesthesia/Blood Transfusion Reactions: No Reported Reaction, Motion 

Sickness


Past Psychological History: No Psychological Hx Reported


Smoking Status: Current every day smoker


Past Alcohol Use History: Occasional


Past Drug Use History: None Reported





- Past Family History


  ** Mother


Family Medical History: Hypertension





  ** Father


Family Medical History: Cancer


Additional Family Medical History / Comment(s): prostate cancer





General Exam





- General Exam Comments


Initial Comments: 





GENERAL: 


Patient is well-developed and well-nourished.  Patient is nontoxic and in no 

acute distress.





HEAD: 


Atraumatic, normocephalic.





EYES:


Pupils equal round and reactive to light, extraocular movements intact, sclera 

anicteric, conjunctiva are normal.  Eyelids were unremarkable.





ENT: 


Nares patent, oropharynx clear without exudates.  Moist mucous membranes.





NECK: 


Normal range of motion, supple without lymphadenopathy or JVD.





LUNGS:


Unlabored respirations.  Breath sounds clear to auscultation bilaterally and 

equal.  No wheezes rales or rhonchi.





HEART:


Regular rate and rhythm without murmurs, rubs or gallops.





ABDOMEN: 


Soft, nontender, normoactive bowel sounds.  No guarding, no rebound.  No masses 

appreciated.





MUSCULOSKELETAL: 


Normal extremities with adequate strength and normal range of motion, no pitting

or edema.  No clubbing or cyanosis.





NEUROLOGICAL: 


Patient is alert and oriented x 3.  Motor and sensory are also intact.  Cranial 

nerves II through XII grossly intact.  Symmetrical smile.  Normal speech, normal

gait.   





PSYCH:


Normal mood, normal affect.





SKIN:


 Warm, Dry, normal turgor, no rashes or lesions noted.


Limitations: no limitations





Course


                                   Vital Signs











  10/25/21 10/25/21





  14:53 17:46


 


Temperature 99.6 F 98.8 F


 


Pulse Rate 82 65


 


Respiratory 18 18





Rate  


 


Blood Pressure 111/73 95/62


 


O2 Sat by Pulse 99 100





Oximetry  














Medical Decision Making





- Medical Decision Making





Patient is a 41-year-old female here with headache, body aches, chills, eye pain

over the past 2 days.  Intermittent nausea.  Her vitals are stable.  Exam is 

unremarkable, no acute neuro deficits.  Patient was given IV fluids, pain 

control.  Rapid Covid is not detected.  Patient was reexamined, her headache is 

improved, currently rates it a 3/10.  She feels stable enough to go home.  I 

recommended Excedrin Extra Strength for future headaches.  She is agreeable to 

this plan of care.  Return parameters were discussed with her and she verbalized

understanding.





- Lab Data


                                   Lab Results











  10/25/21 Range/Units





  14:57 


 


Coronavirus (PCR)  Not Detected  (Not Detectd)  














Disposition


Clinical Impression: 


 Headache, Viral illness





Disposition: HOME SELF-CARE


Condition: Stable


Instructions (If sedation given, give patient instructions):  Acute Headache 

(ED)


Additional Instructions: 


Please return to the Emergency Department if symptoms worsen or any other 

concerns.


Covid test is negative.


Recommended rest, increase your fluids, trial of Tylenol and/or Motrin for 

future headaches as well as Excedrin extra strength.


Follow-up with your primary care doctor.


Is patient prescribed a controlled substance at d/c from ED?: No


Referrals: 


Pankaj Resendiz MD [Primary Care Provider] - 1-2 days


Time of Disposition: 18:01